# Patient Record
Sex: FEMALE | Race: OTHER | Employment: FULL TIME | URBAN - METROPOLITAN AREA
[De-identification: names, ages, dates, MRNs, and addresses within clinical notes are randomized per-mention and may not be internally consistent; named-entity substitution may affect disease eponyms.]

---

## 2017-04-12 ENCOUNTER — EMPLOYEE WELLNESS (OUTPATIENT)
Dept: OTHER | Age: 62
End: 2017-04-12

## 2017-04-12 LAB
CHOLESTEROL/HDL RATIO: 3.7
CHOLESTEROL/HDL RATIO: NORMAL
CHOLESTEROL: 214 MG/DL
CHOLESTEROL: NORMAL MG/DL
GLUCOSE BLD-MCNC: 75 MG/DL (ref 70–99)
GLUCOSE BLD-MCNC: NORMAL MG/DL
HDLC SERPL-MCNC: 58 MG/DL
HDLC SERPL-MCNC: NORMAL MG/DL
LDL CHOLESTEROL: 136 MG/DL (ref 0–130)
LDL CHOLESTEROL: NORMAL MG/DL
PATIENT FASTING?: ABNORMAL
PATIENT FASTING?: NORMAL
TRIGL SERPL-MCNC: 99 MG/DL
TRIGL SERPL-MCNC: NORMAL MG/DL
VLDLC SERPL CALC-MCNC: ABNORMAL MG/DL (ref 1–30)
VLDLC SERPL CALC-MCNC: NORMAL MG/DL

## 2017-05-09 ENCOUNTER — HOSPITAL ENCOUNTER (OUTPATIENT)
Age: 62
Setting detail: SPECIMEN
Discharge: HOME | End: 2017-05-09
Payer: COMMERCIAL

## 2017-05-12 LAB — SURGICAL PATHOLOGY REPORT: NORMAL

## 2017-05-18 ENCOUNTER — HOSPITAL ENCOUNTER (OUTPATIENT)
Age: 62
Discharge: HOME | End: 2017-05-18
Payer: COMMERCIAL

## 2017-05-18 LAB
ABSOLUTE RETIC #: 0.04 M/UL (ref 0.02–0.1)
FERRITIN: 217 UG/L (ref 13–150)
IRON SATURATION: 22 % (ref 20–55)
IRON: 54 UG/DL (ref 37–145)
RETIC %: 0.9 % (ref 0.5–2)
T3 FREE: 3.39 PG/ML (ref 2.02–4.43)
THYROXINE, FREE: 1.1 NG/DL (ref 0.93–1.7)
TOTAL IRON BINDING CAPACITY: 243 UG/DL (ref 250–450)
TSH SERPL DL<=0.05 MIU/L-ACNC: 2.83 MIU/L (ref 0.3–5)
UNSATURATED IRON BINDING CAPACITY: 189 UG/DL (ref 112–347)
VITAMIN B-12: 699 PG/ML (ref 211–946)
VITAMIN D 25-HYDROXY: 43.5 NG/ML (ref 30–100)

## 2017-05-18 PROCEDURE — 85045 AUTOMATED RETICULOCYTE COUNT: CPT

## 2017-05-18 PROCEDURE — 84481 FREE ASSAY (FT-3): CPT

## 2017-05-18 PROCEDURE — 82728 ASSAY OF FERRITIN: CPT

## 2017-05-18 PROCEDURE — 83540 ASSAY OF IRON: CPT

## 2017-05-18 PROCEDURE — 83550 IRON BINDING TEST: CPT

## 2017-05-18 PROCEDURE — 84439 ASSAY OF FREE THYROXINE: CPT

## 2017-05-18 PROCEDURE — 84443 ASSAY THYROID STIM HORMONE: CPT

## 2017-05-18 PROCEDURE — 82607 VITAMIN B-12: CPT

## 2017-05-18 PROCEDURE — 82306 VITAMIN D 25 HYDROXY: CPT

## 2017-05-18 PROCEDURE — 36415 COLL VENOUS BLD VENIPUNCTURE: CPT

## 2017-06-01 ENCOUNTER — HOSPITAL ENCOUNTER (OUTPATIENT)
Age: 62
Discharge: HOME | End: 2017-06-01
Payer: COMMERCIAL

## 2017-06-01 LAB
CHOLESTEROL, FASTING: 225 MG/DL
CHOLESTEROL/HDL RATIO: 4
HDLC SERPL-MCNC: 56 MG/DL
LDL CHOLESTEROL: 150 MG/DL (ref 0–130)
TRIGLYCERIDE, FASTING: 95 MG/DL
VLDLC SERPL CALC-MCNC: ABNORMAL MG/DL (ref 1–30)

## 2017-06-01 PROCEDURE — 36415 COLL VENOUS BLD VENIPUNCTURE: CPT

## 2017-06-01 PROCEDURE — 80061 LIPID PANEL: CPT

## 2017-07-19 ENCOUNTER — HOSPITAL ENCOUNTER (OUTPATIENT)
Age: 62
Setting detail: SPECIMEN
Discharge: HOME | End: 2017-07-19
Payer: COMMERCIAL

## 2017-07-24 LAB — CYTOLOGY REPORT: NORMAL

## 2017-10-23 ENCOUNTER — HOSPITAL ENCOUNTER (OUTPATIENT)
Age: 62
Discharge: HOME | End: 2017-10-23
Payer: COMMERCIAL

## 2017-10-23 LAB
-: NORMAL
ABSOLUTE EOS #: 0.2 K/UL (ref 0–0.4)
ABSOLUTE EOS #: 0.2 K/UL (ref 0–0.4)
ABSOLUTE IMMATURE GRANULOCYTE: ABNORMAL K/UL (ref 0–0.3)
ABSOLUTE IMMATURE GRANULOCYTE: ABNORMAL K/UL (ref 0–0.3)
ABSOLUTE LYMPH #: 1.4 K/UL (ref 1–4.8)
ABSOLUTE LYMPH #: 1.4 K/UL (ref 1–4.8)
ABSOLUTE MONO #: 0.5 K/UL (ref 0.2–0.8)
ABSOLUTE MONO #: 0.5 K/UL (ref 0.2–0.8)
ABSOLUTE RETIC #: 0.07 M/UL (ref 0.02–0.1)
ALBUMIN SERPL-MCNC: 4.2 G/DL (ref 3.5–5.2)
ALBUMIN/GLOBULIN RATIO: ABNORMAL (ref 1–2.5)
ALP BLD-CCNC: 71 U/L (ref 35–104)
ALT SERPL-CCNC: 15 U/L (ref 5–33)
AMORPHOUS: NORMAL
ANION GAP SERPL CALCULATED.3IONS-SCNC: 14 MMOL/L (ref 9–17)
AST SERPL-CCNC: 19 U/L
BACTERIA: NORMAL
BASOPHILS # BLD: 0 %
BASOPHILS # BLD: 0 %
BASOPHILS ABSOLUTE: 0 K/UL (ref 0–0.2)
BASOPHILS ABSOLUTE: 0 K/UL (ref 0–0.2)
BILIRUB SERPL-MCNC: 0.41 MG/DL (ref 0.3–1.2)
BILIRUBIN URINE: NEGATIVE
BUN BLDV-MCNC: 16 MG/DL (ref 8–23)
BUN/CREAT BLD: 25 (ref 9–20)
CALCIUM IONIZED: 1.24 MMOL/L (ref 1.13–1.33)
CALCIUM SERPL-MCNC: 9.1 MG/DL (ref 8.6–10.4)
CASTS UA: NORMAL /LPF
CHLORIDE BLD-SCNC: 103 MMOL/L (ref 98–107)
CHOLESTEROL, FASTING: 213 MG/DL
CHOLESTEROL/HDL RATIO: 3.2
CO2: 24 MMOL/L (ref 20–31)
COLOR: YELLOW
COMMENT UA: ABNORMAL
CREAT SERPL-MCNC: 0.65 MG/DL (ref 0.5–0.9)
CRYSTALS, UA: NORMAL /HPF
DIFFERENTIAL TYPE: ABNORMAL
DIFFERENTIAL TYPE: ABNORMAL
EOSINOPHILS RELATIVE PERCENT: 3 %
EOSINOPHILS RELATIVE PERCENT: 3 %
EPITHELIAL CELLS UA: NORMAL /HPF
FERRITIN: 154 UG/L (ref 13–150)
GFR AFRICAN AMERICAN: >60 ML/MIN
GFR NON-AFRICAN AMERICAN: >60 ML/MIN
GFR SERPL CREATININE-BSD FRML MDRD: ABNORMAL ML/MIN/{1.73_M2}
GFR SERPL CREATININE-BSD FRML MDRD: ABNORMAL ML/MIN/{1.73_M2}
GLUCOSE FASTING: 76 MG/DL (ref 70–99)
GLUCOSE URINE: NEGATIVE
HCT VFR BLD CALC: 36.3 % (ref 36–46)
HCT VFR BLD CALC: 36.3 % (ref 36–46)
HDLC SERPL-MCNC: 67 MG/DL
HEMOGLOBIN: 12.1 G/DL (ref 12–16)
HEMOGLOBIN: 12.2 G/DL (ref 12–16)
IMMATURE GRANULOCYTES: ABNORMAL %
IMMATURE GRANULOCYTES: ABNORMAL %
IRON SATURATION: 12 % (ref 20–55)
IRON: 31 UG/DL (ref 37–145)
KETONES, URINE: NEGATIVE
LDL CHOLESTEROL: 124 MG/DL (ref 0–130)
LEUKOCYTE ESTERASE, URINE: NEGATIVE
LYMPHOCYTES # BLD: 17 %
LYMPHOCYTES # BLD: 18 %
MCH RBC QN AUTO: 27.5 PG (ref 26–34)
MCH RBC QN AUTO: 27.7 PG (ref 26–34)
MCHC RBC AUTO-ENTMCNC: 33.4 G/DL (ref 31–37)
MCHC RBC AUTO-ENTMCNC: 33.7 G/DL (ref 31–37)
MCV RBC AUTO: 82.1 FL (ref 80–100)
MCV RBC AUTO: 82.4 FL (ref 80–100)
MONOCYTES # BLD: 6 %
MONOCYTES # BLD: 6 %
MUCUS: NORMAL
NITRITE, URINE: NEGATIVE
OTHER OBSERVATIONS UA: NORMAL
PDW BLD-RTO: 15 % (ref 11.5–14.5)
PDW BLD-RTO: 15.1 % (ref 11.5–14.5)
PH UA: 5.5 (ref 5–8)
PLATELET # BLD: 207 K/UL (ref 130–400)
PLATELET # BLD: 211 K/UL (ref 130–400)
PLATELET ESTIMATE: ABNORMAL
PLATELET ESTIMATE: ABNORMAL
PMV BLD AUTO: 9.7 FL (ref 6–12)
PMV BLD AUTO: 9.7 FL (ref 6–12)
POTASSIUM SERPL-SCNC: 3.6 MMOL/L (ref 3.7–5.3)
PROTEIN UA: NEGATIVE
RBC # BLD: 4.4 M/UL (ref 4–5.2)
RBC # BLD: 4.42 M/UL (ref 4–5.2)
RBC # BLD: ABNORMAL 10*6/UL
RBC # BLD: ABNORMAL 10*6/UL
RBC UA: NORMAL /HPF (ref 0–2)
RENAL EPITHELIAL, UA: NORMAL /HPF
RETIC %: 1.5 % (ref 0.5–2)
RETIC HEMOGLOBIN: NORMAL PG (ref 28.2–35.7)
SEG NEUTROPHILS: 73 %
SEG NEUTROPHILS: 74 %
SEGMENTED NEUTROPHILS ABSOLUTE COUNT: 5.9 K/UL (ref 1.8–7.7)
SEGMENTED NEUTROPHILS ABSOLUTE COUNT: 6.1 K/UL (ref 1.8–7.7)
SODIUM BLD-SCNC: 141 MMOL/L (ref 135–144)
SPECIFIC GRAVITY UA: 1.01 (ref 1–1.03)
TOTAL CK: 117 U/L (ref 26–192)
TOTAL IRON BINDING CAPACITY: 258 UG/DL (ref 250–450)
TOTAL PROTEIN: 7.4 G/DL (ref 6.4–8.3)
TRICHOMONAS: NORMAL
TRIGLYCERIDE, FASTING: 109 MG/DL
TSH SERPL DL<=0.05 MIU/L-ACNC: 3.36 MIU/L (ref 0.3–5)
TURBIDITY: CLEAR
UNSATURATED IRON BINDING CAPACITY: 227 UG/DL (ref 112–347)
URINE HGB: ABNORMAL
UROBILINOGEN, URINE: NORMAL
VITAMIN D 25-HYDROXY: 52.5 NG/ML (ref 30–100)
VLDLC SERPL CALC-MCNC: ABNORMAL MG/DL (ref 1–30)
WBC # BLD: 8.1 K/UL (ref 3.5–11)
WBC # BLD: 8.3 K/UL (ref 3.5–11)
WBC # BLD: ABNORMAL 10*3/UL
WBC # BLD: ABNORMAL 10*3/UL
WBC UA: NORMAL /HPF (ref 0–5)
YEAST: NORMAL

## 2017-10-23 PROCEDURE — 82550 ASSAY OF CK (CPK): CPT

## 2017-10-23 PROCEDURE — 85025 COMPLETE CBC W/AUTO DIFF WBC: CPT

## 2017-10-23 PROCEDURE — 85045 AUTOMATED RETICULOCYTE COUNT: CPT

## 2017-10-23 PROCEDURE — 80053 COMPREHEN METABOLIC PANEL: CPT

## 2017-10-23 PROCEDURE — 82728 ASSAY OF FERRITIN: CPT

## 2017-10-23 PROCEDURE — 83550 IRON BINDING TEST: CPT

## 2017-10-23 PROCEDURE — 36415 COLL VENOUS BLD VENIPUNCTURE: CPT

## 2017-10-23 PROCEDURE — 83540 ASSAY OF IRON: CPT

## 2017-10-23 PROCEDURE — 84443 ASSAY THYROID STIM HORMONE: CPT

## 2017-10-23 PROCEDURE — 82306 VITAMIN D 25 HYDROXY: CPT

## 2017-10-23 PROCEDURE — 82330 ASSAY OF CALCIUM: CPT

## 2017-10-23 PROCEDURE — 80061 LIPID PANEL: CPT

## 2017-10-23 PROCEDURE — 81001 URINALYSIS AUTO W/SCOPE: CPT

## 2017-11-16 ENCOUNTER — TELEPHONE (OUTPATIENT)
Dept: INFUSION THERAPY | Facility: MEDICAL CENTER | Age: 62
End: 2017-11-16

## 2017-11-22 PROBLEM — M81.0 AGE-RELATED OSTEOPOROSIS WITHOUT CURRENT PATHOLOGICAL FRACTURE: Status: ACTIVE | Noted: 2017-11-22

## 2017-12-01 ENCOUNTER — HOSPITAL ENCOUNTER (OUTPATIENT)
Dept: INFUSION THERAPY | Facility: MEDICAL CENTER | Age: 62
Discharge: HOME | End: 2017-12-01
Payer: COMMERCIAL

## 2017-12-01 VITALS
HEART RATE: 58 BPM | SYSTOLIC BLOOD PRESSURE: 118 MMHG | TEMPERATURE: 97.4 F | DIASTOLIC BLOOD PRESSURE: 57 MMHG | RESPIRATION RATE: 18 BRPM

## 2017-12-01 DIAGNOSIS — M81.0 AGE-RELATED OSTEOPOROSIS WITHOUT CURRENT PATHOLOGICAL FRACTURE: ICD-10-CM

## 2017-12-01 PROCEDURE — 96401 CHEMO ANTI-NEOPL SQ/IM: CPT

## 2017-12-01 NOTE — PROGRESS NOTES
Patient arrives per ambulation for prolia injection. Orders reviewed. Patient states, \"I am not taking Boniva. \"  Tolerated injection well, no complaints voiced. Patient given reminder card from medication container when next prolia injection due, June 2018. Patient states, \"Will go to front office to schedule next injection. Discharged per ambulation.

## 2018-03-20 VITALS — BODY MASS INDEX: 21.8 KG/M2 | WEIGHT: 127 LBS

## 2018-04-17 ENCOUNTER — EMPLOYEE WELLNESS (OUTPATIENT)
Dept: OTHER | Age: 63
End: 2018-04-17

## 2018-04-17 LAB
CHOLESTEROL/HDL RATIO: 4.5
CHOLESTEROL: 248 MG/DL
GLUCOSE BLD-MCNC: 84 MG/DL (ref 70–99)
HDLC SERPL-MCNC: 55 MG/DL
LDL CHOLESTEROL: 170 MG/DL (ref 0–130)
PATIENT FASTING?: YES
TRIGL SERPL-MCNC: 117 MG/DL
VLDLC SERPL CALC-MCNC: ABNORMAL MG/DL (ref 1–30)

## 2018-04-23 VITALS — WEIGHT: 125 LBS | BODY MASS INDEX: 21.46 KG/M2

## 2018-08-01 DIAGNOSIS — E61.1 IRON DEFICIENCY: ICD-10-CM

## 2018-08-17 ENCOUNTER — HOSPITAL ENCOUNTER (OUTPATIENT)
Dept: MAMMOGRAPHY | Age: 63
Discharge: HOME | End: 2018-08-19
Payer: COMMERCIAL

## 2018-08-17 ENCOUNTER — HOSPITAL ENCOUNTER (OUTPATIENT)
Age: 63
Discharge: HOME | End: 2018-08-17
Payer: COMMERCIAL

## 2018-08-17 DIAGNOSIS — E61.1 IRON DEFICIENCY: ICD-10-CM

## 2018-08-17 DIAGNOSIS — Z13.820 SCREENING FOR OSTEOPOROSIS: ICD-10-CM

## 2018-08-17 DIAGNOSIS — Z12.39 SCREENING BREAST EXAMINATION: ICD-10-CM

## 2018-08-17 LAB
ABSOLUTE EOS #: 0.1 K/UL (ref 0–0.4)
ABSOLUTE IMMATURE GRANULOCYTE: ABNORMAL K/UL (ref 0–0.3)
ABSOLUTE LYMPH #: 1.9 K/UL (ref 1–4.8)
ABSOLUTE MONO #: 0.4 K/UL (ref 0.2–0.8)
ABSOLUTE RETIC #: 0.04 M/UL (ref 0.02–0.1)
BASOPHILS # BLD: 0 % (ref 0–2)
BASOPHILS ABSOLUTE: 0 K/UL (ref 0–0.2)
DIFFERENTIAL TYPE: ABNORMAL
EOSINOPHILS RELATIVE PERCENT: 2 % (ref 1–4)
FERRITIN: 176 UG/L (ref 13–150)
HCT VFR BLD CALC: 39.5 % (ref 36–46)
HEMOGLOBIN: 12.9 G/DL (ref 12–16)
IMMATURE GRANULOCYTES: ABNORMAL %
IMMATURE RETIC FRACT: NORMAL %
IRON SATURATION: 26 % (ref 20–55)
IRON: 68 UG/DL (ref 37–145)
LYMPHOCYTES # BLD: 31 % (ref 24–44)
MCH RBC QN AUTO: 27.2 PG (ref 26–34)
MCHC RBC AUTO-ENTMCNC: 32.8 G/DL (ref 31–37)
MCV RBC AUTO: 83.2 FL (ref 80–100)
MONOCYTES # BLD: 6 % (ref 1–7)
NRBC AUTOMATED: ABNORMAL PER 100 WBC
PDW BLD-RTO: 14.9 % (ref 11.5–14.5)
PLATELET # BLD: 242 K/UL (ref 130–400)
PLATELET ESTIMATE: ABNORMAL
PMV BLD AUTO: 9.9 FL (ref 6–12)
RBC # BLD: 4.75 M/UL (ref 4–5.2)
RBC # BLD: ABNORMAL 10*6/UL
RETIC %: 0.9 % (ref 0.5–2)
RETIC HEMOGLOBIN: NORMAL PG (ref 28.2–35.7)
SEG NEUTROPHILS: 61 % (ref 36–66)
SEGMENTED NEUTROPHILS ABSOLUTE COUNT: 3.7 K/UL (ref 1.8–7.7)
TOTAL IRON BINDING CAPACITY: 260 UG/DL (ref 250–450)
UNSATURATED IRON BINDING CAPACITY: 192 UG/DL (ref 112–347)
WBC # BLD: 6.1 K/UL (ref 3.5–11)
WBC # BLD: ABNORMAL 10*3/UL

## 2018-08-17 PROCEDURE — 83540 ASSAY OF IRON: CPT

## 2018-08-17 PROCEDURE — 85045 AUTOMATED RETICULOCYTE COUNT: CPT

## 2018-08-17 PROCEDURE — 85025 COMPLETE CBC W/AUTO DIFF WBC: CPT

## 2018-08-17 PROCEDURE — 36415 COLL VENOUS BLD VENIPUNCTURE: CPT

## 2018-08-17 PROCEDURE — 77080 DXA BONE DENSITY AXIAL: CPT

## 2018-08-17 PROCEDURE — 82728 ASSAY OF FERRITIN: CPT

## 2018-08-17 PROCEDURE — 83550 IRON BINDING TEST: CPT

## 2018-08-17 PROCEDURE — 77067 SCR MAMMO BI INCL CAD: CPT

## 2018-08-20 ENCOUNTER — TELEPHONE (OUTPATIENT)
Dept: ONCOLOGY | Age: 63
End: 2018-08-20

## 2018-08-22 ENCOUNTER — HOSPITAL ENCOUNTER (OUTPATIENT)
Age: 63
Discharge: HOME | End: 2018-08-24
Payer: COMMERCIAL

## 2018-08-22 ENCOUNTER — HOSPITAL ENCOUNTER (OUTPATIENT)
Dept: GENERAL RADIOLOGY | Age: 63
Discharge: HOME | End: 2018-08-24
Payer: COMMERCIAL

## 2018-08-22 DIAGNOSIS — M25.552 LEFT HIP PAIN: ICD-10-CM

## 2018-08-22 PROCEDURE — 73502 X-RAY EXAM HIP UNI 2-3 VIEWS: CPT

## 2018-09-25 ENCOUNTER — HOSPITAL ENCOUNTER (OUTPATIENT)
Age: 63
Discharge: HOME | End: 2018-09-25
Payer: COMMERCIAL

## 2018-09-25 LAB
ALBUMIN SERPL-MCNC: 4.5 G/DL (ref 3.5–5.2)
ALBUMIN/GLOBULIN RATIO: ABNORMAL (ref 1–2.5)
ALP BLD-CCNC: 53 U/L (ref 35–104)
ALT SERPL-CCNC: 13 U/L (ref 5–33)
ANION GAP SERPL CALCULATED.3IONS-SCNC: 11 MMOL/L (ref 9–17)
AST SERPL-CCNC: 17 U/L
BILIRUB SERPL-MCNC: 0.31 MG/DL (ref 0.3–1.2)
BUN BLDV-MCNC: 16 MG/DL (ref 8–23)
BUN/CREAT BLD: 27 (ref 9–20)
CALCIUM SERPL-MCNC: 9.6 MG/DL (ref 8.6–10.4)
CHLORIDE BLD-SCNC: 103 MMOL/L (ref 98–107)
CHOLESTEROL, FASTING: 229 MG/DL
CHOLESTEROL/HDL RATIO: 3.9
CO2: 27 MMOL/L (ref 20–31)
CREAT SERPL-MCNC: 0.6 MG/DL (ref 0.5–0.9)
GFR AFRICAN AMERICAN: >60 ML/MIN
GFR NON-AFRICAN AMERICAN: >60 ML/MIN
GFR SERPL CREATININE-BSD FRML MDRD: ABNORMAL ML/MIN/{1.73_M2}
GFR SERPL CREATININE-BSD FRML MDRD: ABNORMAL ML/MIN/{1.73_M2}
GLUCOSE FASTING: 89 MG/DL (ref 70–99)
HDLC SERPL-MCNC: 59 MG/DL
LDL CHOLESTEROL: 149 MG/DL (ref 0–130)
POTASSIUM SERPL-SCNC: 4.6 MMOL/L (ref 3.7–5.3)
SODIUM BLD-SCNC: 141 MMOL/L (ref 135–144)
T3 FREE: 3.26 PG/ML (ref 2.02–4.43)
T4 TOTAL: 7.8 UG/DL (ref 4.5–12)
TOTAL PROTEIN: 7.5 G/DL (ref 6.4–8.3)
TRIGLYCERIDE, FASTING: 103 MG/DL
TSH SERPL DL<=0.05 MIU/L-ACNC: 3.82 MIU/L (ref 0.3–5)
VITAMIN B-12: >2000 PG/ML (ref 232–1245)
VITAMIN D 25-HYDROXY: 51.8 NG/ML (ref 30–100)
VLDLC SERPL CALC-MCNC: ABNORMAL MG/DL (ref 1–30)

## 2018-09-25 PROCEDURE — 82306 VITAMIN D 25 HYDROXY: CPT

## 2018-09-25 PROCEDURE — 36415 COLL VENOUS BLD VENIPUNCTURE: CPT

## 2018-09-25 PROCEDURE — 82607 VITAMIN B-12: CPT

## 2018-09-25 PROCEDURE — 80061 LIPID PANEL: CPT

## 2018-09-25 PROCEDURE — 84481 FREE ASSAY (FT-3): CPT

## 2018-09-25 PROCEDURE — 84443 ASSAY THYROID STIM HORMONE: CPT

## 2018-09-25 PROCEDURE — 86800 THYROGLOBULIN ANTIBODY: CPT

## 2018-09-25 PROCEDURE — 80053 COMPREHEN METABOLIC PANEL: CPT

## 2018-09-25 PROCEDURE — 86376 MICROSOMAL ANTIBODY EACH: CPT

## 2018-09-25 PROCEDURE — 84436 ASSAY OF TOTAL THYROXINE: CPT

## 2018-09-26 LAB
THYROGLOBULIN AB: <20 IU/ML (ref 0–40)
THYROID PEROXIDASE (TPO) AB: 96.6 IU/ML (ref 0–35)

## 2018-09-27 ENCOUNTER — HOSPITAL ENCOUNTER (OUTPATIENT)
Age: 63
Discharge: HOME | End: 2018-09-27
Payer: COMMERCIAL

## 2018-09-27 DIAGNOSIS — M79.89 PAIN AND SWELLING OF LOWER LEG, UNSPECIFIED LATERALITY: Primary | ICD-10-CM

## 2018-09-27 DIAGNOSIS — M79.669 PAIN AND SWELLING OF LOWER LEG, UNSPECIFIED LATERALITY: Primary | ICD-10-CM

## 2018-09-27 PROCEDURE — 93970 EXTREMITY STUDY: CPT

## 2018-11-06 ENCOUNTER — HOSPITAL ENCOUNTER (OUTPATIENT)
Age: 63
Setting detail: SPECIMEN
Discharge: HOME | End: 2018-11-06
Payer: COMMERCIAL

## 2018-11-15 ENCOUNTER — HOSPITAL ENCOUNTER (OUTPATIENT)
Dept: GENERAL RADIOLOGY | Age: 63
Discharge: HOME | End: 2018-11-17
Payer: COMMERCIAL

## 2018-11-15 ENCOUNTER — HOSPITAL ENCOUNTER (OUTPATIENT)
Age: 63
Discharge: HOME | End: 2018-11-17
Payer: COMMERCIAL

## 2018-11-15 DIAGNOSIS — M79.605 PAIN OF LEFT LEG: ICD-10-CM

## 2018-11-15 PROCEDURE — 73552 X-RAY EXAM OF FEMUR 2/>: CPT

## 2018-11-26 ENCOUNTER — HOSPITAL ENCOUNTER (OUTPATIENT)
Dept: PHYSICAL THERAPY | Facility: CLINIC | Age: 63
Setting detail: THERAPIES SERIES
Discharge: HOME | End: 2018-11-26
Payer: COMMERCIAL

## 2018-11-26 LAB — CYTOLOGY REPORT: NORMAL

## 2018-11-26 PROCEDURE — 97110 THERAPEUTIC EXERCISES: CPT

## 2018-11-26 PROCEDURE — 97161 PT EVAL LOW COMPLEX 20 MIN: CPT

## 2018-11-28 ENCOUNTER — HOSPITAL ENCOUNTER (OUTPATIENT)
Dept: PHYSICAL THERAPY | Facility: CLINIC | Age: 63
Setting detail: THERAPIES SERIES
Discharge: HOME | End: 2018-11-28
Payer: COMMERCIAL

## 2018-11-28 PROCEDURE — 97110 THERAPEUTIC EXERCISES: CPT

## 2018-11-28 PROCEDURE — 97016 VASOPNEUMATIC DEVICE THERAPY: CPT

## 2018-12-03 ENCOUNTER — HOSPITAL ENCOUNTER (OUTPATIENT)
Dept: PHYSICAL THERAPY | Facility: CLINIC | Age: 63
Setting detail: THERAPIES SERIES
Discharge: HOME | End: 2018-12-03
Payer: COMMERCIAL

## 2018-12-03 PROCEDURE — 97110 THERAPEUTIC EXERCISES: CPT

## 2018-12-03 PROCEDURE — 97016 VASOPNEUMATIC DEVICE THERAPY: CPT

## 2018-12-05 ENCOUNTER — APPOINTMENT (OUTPATIENT)
Dept: PHYSICAL THERAPY | Facility: CLINIC | Age: 63
End: 2018-12-05
Payer: COMMERCIAL

## 2018-12-07 ENCOUNTER — HOSPITAL ENCOUNTER (OUTPATIENT)
Dept: PHYSICAL THERAPY | Facility: CLINIC | Age: 63
Setting detail: THERAPIES SERIES
Discharge: HOME | End: 2018-12-07
Payer: COMMERCIAL

## 2018-12-10 ENCOUNTER — HOSPITAL ENCOUNTER (OUTPATIENT)
Dept: PHYSICAL THERAPY | Facility: CLINIC | Age: 63
Setting detail: THERAPIES SERIES
Discharge: HOME | End: 2018-12-10
Payer: COMMERCIAL

## 2018-12-10 PROCEDURE — 97110 THERAPEUTIC EXERCISES: CPT

## 2018-12-10 PROCEDURE — 97016 VASOPNEUMATIC DEVICE THERAPY: CPT

## 2018-12-10 PROCEDURE — 97140 MANUAL THERAPY 1/> REGIONS: CPT

## 2018-12-10 NOTE — FLOWSHEET NOTE
min     Specific Instructions for next treatment: Continue hip strengthening and IT band stretching. Ice for hip bursitis.             Treatment Charges: Mins Units   []  Modalities     [x]  Ther Exercise 20 1   [x]  Manual Therapy 10 1   []  Ther Activities     []  Aquatics     [x]  Vasocompression 15 1   []  Other     Total Treatment time 45 3       Assessment: [x] Progressing toward goals. Pt tolerates treatment well with no c/o pain. Pt requires cuing during exercises to execute with proper technique, and requests more time spent with roller massage as she found that to be very beneficial after last treatment. [] No change. [] Other:    STG: (to be met in 6 treatments)  1. ? Pain: Pt will report decreased pain to 3/10 to improve ability to stand at work. 2. ? ROM: Pt will improve L hip IR ROM from 27 degrees to 43 degrees to match the R hip and improve ability to putt on shoes and socks (item e). 3. ? Strength: Pt will improve L hip flexion and abduction to 5/5 to improve ability to lift objects off the floor (item g). 4. ? Function: Pt will improve the ability to participate in recreational activities of walking from quite a bit of difficulty to moderate difficulty (item b). 5. Independent with Home Exercise Programs  6. Demonstrate Knowledge of fall prevention  LTG: (to be met in 12 treatments)  1. Pt will report decreased pain to 0/10 to improve ability to stand all day at work. 2. Pt will report ability to walk 5 miles in usual exercise routine in order to improve participation.                     Patient goals: Increase hip strength    Pt. Education:  [x] Yes  [x] No  [x] Reviewed Prior HEP/Ed  Method of Education: [x] Verbal  [x] Demo  [] Written    Comprehension of Education:  [] Verbalizes understanding. [] Demonstrates understanding. [] Needs review. [x] Demonstrates/verbalizes HEP/Ed previously given. Plan: [x] Continue per plan of care.    [] Other:      Time In: 6:10pm Time Out: 7:00pm    Electronically signed by:  Irvin Frances PTA

## 2018-12-12 ENCOUNTER — HOSPITAL ENCOUNTER (OUTPATIENT)
Dept: PHYSICAL THERAPY | Facility: CLINIC | Age: 63
Setting detail: THERAPIES SERIES
Discharge: HOME | End: 2018-12-12
Payer: COMMERCIAL

## 2018-12-12 PROCEDURE — 97110 THERAPEUTIC EXERCISES: CPT

## 2018-12-12 PROCEDURE — 97140 MANUAL THERAPY 1/> REGIONS: CPT

## 2018-12-12 PROCEDURE — 97016 VASOPNEUMATIC DEVICE THERAPY: CPT

## 2018-12-12 NOTE — FLOWSHEET NOTE
[] [facility]       Outpatient Physical        Therapy       [address]       Phone: [phone]       Fax: [phone] [x] [facility] at [address]       Phone: [phone]       Fax: [phone] [] [facility]. [address] [place] [address] Health Promotion  [address] [place] [address]   Phone: [phone]   Fax:  [phone]     Physical Therapy Daily Treatment Note    Date:  2018  Patient Name:  Andres Pollard    :  1955  MRN: [id number]  Physician: Karlene George MD     Insurance: Medical [employer]  Diagnosis: L hip pain  Onset Date: 2018  Next Dr. Herminio Holcomb: TBD  Visit# / total visits:   Cancels/No Shows: 1/0    Subjective:    Pain:  [] Yes  [x] No Location: left hip Pain Rating: (0-10 scale) 0/10 at this time  Pain altered Tx:  [x] No  [] Yes  Action:    Comments: Pt again denying pain at this time. States that she \"feels pretty good\".     Objective:  Modalities: vasocompression with Moderate pressure and 34 degrees supine   Precautions:  Exercises: bolded completed   Exercise Reps/ Time Weight/ Level Comments   Nustep 5' L4 Added    OKC 3 way hip 20 each blueberry increased reps 12/10/18   CKC 3 way hip 20 each blueberry     Step ups  15x LLE 6\" Added  with glute/hip engagement    Monster walk  15 feet green Max cuing added    Lateral steppinng 20 feet both directions Green at ankle Added  max cuing                MAT      Reverse clamshell 20 Green   progressed resistance 12/10/18   clamshell 20 Green   progressed resistance 12/10/18   Long sitting cross stretch 30\"x2  Added    IT band stretch star 30\"x2   Added     Bridge with Abduction and band 30x Blue  t-band  Progressed resistance 12/10/18   SLR flexion 15x  Added    Hip ext 20x  Added    Sidelying hip abduction 20  Added    Other:roller bar massage this date to L hip and IT band in side lying with pillow

## 2018-12-17 ENCOUNTER — HOSPITAL ENCOUNTER (OUTPATIENT)
Dept: PHYSICAL THERAPY | Facility: CLINIC | Age: 63
Setting detail: THERAPIES SERIES
Discharge: HOME | End: 2018-12-17
Payer: COMMERCIAL

## 2018-12-17 NOTE — FLOWSHEET NOTE
[] [facility] [address]  [address]    [phone]  F: [phone] [] [address]  [facility]   [address]  P: [phone]  F: [phone] [] [place]  [address] [facility]  P: [phone]  F: [phone] [] [address]  [facility]   [address]   [place]: [phone]  F: [phone] [] [facility]  [address] [facility] [address]  [place]: [phone]   F: [phone]      Physical Therapy Cancel/No Show note    Date: 2018  Patient: Megan Simmons  : 1955  MRN: [id number]    Cancels/No Shows to date:     For today's appointment patient:  [x]  Cancelled  []  Rescheduled appointment  []  No-show     Reason given by patient:  []  Patient ill  []  Conflicting appointment  []  No transportation    []  Conflict with work  []  No reason given  []  Weather related  [x]  Other:      Comments:  Pt arrives to therapy this date 1/2 hour early noting she would like to cancel today. States she is in high pain today and this is the first time since the beginning of therapy. Pt reports pain was so high she had her  pick her up from work. Pt declined cyrotherapy today.    [x]  Next appointment was confirmed    Electronically signed by: Shirlene Berrios PTA

## 2018-12-19 ENCOUNTER — HOSPITAL ENCOUNTER (OUTPATIENT)
Dept: PHYSICAL THERAPY | Facility: CLINIC | Age: 63
Setting detail: THERAPIES SERIES
Discharge: HOME | End: 2018-12-19
Payer: COMMERCIAL

## 2018-12-19 PROCEDURE — 97016 VASOPNEUMATIC DEVICE THERAPY: CPT

## 2018-12-19 PROCEDURE — 97140 MANUAL THERAPY 1/> REGIONS: CPT

## 2018-12-19 PROCEDURE — 97110 THERAPEUTIC EXERCISES: CPT

## 2018-12-19 NOTE — FLOWSHEET NOTE
[] Lar[facility]       Outpatient Physical        Therapy       [address]       Phone: [phone]       Fax: [phone] [x] [facility] Promotion at [address]       Phone: [phone]       Fax: [phone] [] [place]. [address] [place] [address] Health Promotion  [address] [place] [address]   Phone: [phone]   Fax:  [phone]     Physical Therapy Daily Treatment Note    Date:  2018  Patient Name:  Arlene Gutierrez    :  1955  MRN: [id number]  Physician: Angelique Gray MD     Insurance: [employer] [place]  Diagnosis: L hip pain  Onset Date: 2018  Next Dr. Lozano Danny: TBD  Visit# / total visits:   Cancels/No Shows: 2/0    Subjective:    Pain:  [] Yes  [x] No Location: left hip Pain Rating: (0-10 scale) 0/10 at this time  Pain altered Tx:  [x] No  [] Yes  Action:    Comments: Patient reporting Monday was very painful for her, enough that she couldn't make therapy.      Objective:  Modalities: vasocompression with Moderate pressure and 34 degrees supine   Precautions:  Exercises: bolded completed   Exercise Reps/ Time Weight/ Level Comments   Bike 5' L15 Added  Progressed to bike    OKC 3 way hip 20 each blueberry increased reps 12/10/18   CKC 3 way hip 20 each blueberry     Step ups  15x LLE 6\" Added  with glute/hip engagement    Monster walk  15 feet green Max cuing added    Lateral steppinng 20 feet both directions Green at ankle Added  max cuing                MAT      Reverse clamshell 20 Blue  progressed resistance 18   clamshell 20 Blue  progressed resistance 18   Long sitting cross stretch 30\"x2  Added    IT band stretch star 30\"x2   Added     Bridge with Abduction and band 30x Blue  t-band  Progressed resistance 12/10/18   SLR flexion 15x  Added    Hip ext 20x  Added    Sidelying hip abduction 20  Added    Other:roller bar massage this date to L hip and IT band in side lying with pillow between knees x 10 min     Specific Instructions for next treatment: Continue hip strengthening and IT band stretching. Ice for hip bursitis.             Treatment Charges: Mins Units   []  Modalities     [x]  Ther Exercise 25 1   [x]  Manual Therapy 10 1   []  Ther Activities     []  Aquatics     [x]  Vasocompression 10 1   []  Other     Total Treatment time 45 3       Assessment: [x] Progressing toward goals. [] No change. [] Other:    STG: (to be met in 6 treatments)  1. ? Pain: Pt will report decreased pain to 3/10 to improve ability to stand at work. Met 12/19/18  2. ? ROM: Pt will improve L hip IR ROM from 27 degrees to 43 degrees to match the R hip and improve ability to putt on shoes and socks (item e). 3. ? Strength: Pt will improve L hip flexion and abduction to 5/5 to improve ability to lift objects off the floor (item g). 4. ? Function: Pt will improve the ability to participate in recreational activities of walking from quite a bit of difficulty to moderate difficulty (item b). 5. Independent with Home Exercise Programs Met 12/19/18  6. Demonstrate Knowledge of fall prevention Met 12/19/18  LTG: (to be met in 12 treatments)  1. Pt will report decreased pain to 0/10 to improve ability to stand all day at work. 2. Pt will report ability to walk 5 miles in usual exercise routine in order to improve participation.                     Patient goals: Increase hip strength    Pt. Education:  [x] Yes  [x] No  [x] Reviewed Prior HEP/Ed  Method of Education: [x] Verbal  [x] Demo  [] Written    Comprehension of Education:  [] Verbalizes understanding. [] Demonstrates understanding. [] Needs review. [x] Demonstrates/verbalizes HEP/Ed previously given. Plan: [x] Continue per plan of care.    [] Other:      Time In: 5:55 pm            Time Out: 6:52 pm    Electronically signed by:  Eliezer Kelly PTA

## 2018-12-21 ENCOUNTER — HOSPITAL ENCOUNTER (OUTPATIENT)
Dept: PHYSICAL THERAPY | Facility: CLINIC | Age: 63
Setting detail: THERAPIES SERIES
Discharge: HOME | End: 2018-12-21
Payer: COMMERCIAL

## 2018-12-21 ENCOUNTER — TELEPHONE (OUTPATIENT)
Dept: INTERNAL MEDICINE | Age: 63
End: 2018-12-21

## 2018-12-21 PROCEDURE — 97110 THERAPEUTIC EXERCISES: CPT

## 2018-12-21 NOTE — FLOWSHEET NOTE
stretch 30\"x2  Added 11/28   IT band stretch star 30\"x2   Added 11/28    Bridge with Abduction and band 30x Blue  t-band  Progressed resistance 12/10/18   Marches 15x 1# Added 12/21   SLR flexion 15x 1# Added weight 12/21    Hip ext 15x 1# Added weight 12/21    Sidelying hip abduction 15x 1# Added weight 12/21    Other:roller bar massage this date to L hip and IT band in side lying with pillow between knees x 10 min     Specific Instructions for next treatment: Continue hip strengthening and IT band stretching. Ice for hip bursitis.             Treatment Charges: Mins Units   []  Modalities     [x]  Ther Exercise 45 3   []  Manual Therapy     []  Ther Activities     []  Aquatics     []  Vasocompression     []  Other     Total Treatment time 45 3       Assessment: [x] Progressing toward goals. Patient demonstrating good tolerance to exercise progression as charted above with no complaints of L hip or thigh pain during session with initiation of new exercises. Patient deferring vasocompression at end of session. See STG assessment below- patient continues to demonstrate mild L hip IR PROM limitations this date. [] No change. [] Other:    STG: (to be met in 6 treatments)  1. ? Pain: Pt will report decreased pain to 3/10 to improve ability to stand at work. Met 12/19/18  2. ? ROM: Pt will improve L hip IR ROM from 27 degrees to 43 degrees to match the R hip and improve ability to putt on shoes and socks (item e). Ongoing 12/21/18- measured at 35 degrees  3. ? Strength: Pt will improve L hip flexion and abduction to 5/5 to improve ability to lift objects off the floor (item g). Met 12/21/18 5/5 L hip abduction and flexion  4. ? Function: Pt will improve the ability to participate in recreational activities of walking from quite a bit of difficulty to moderate difficulty (item b). Met 12/21/18  5. Independent with Home Exercise Programs Met 12/19/18  6.  Demonstrate Knowledge of fall prevention Met 12/19/18  LTG:

## 2018-12-28 ENCOUNTER — HOSPITAL ENCOUNTER (OUTPATIENT)
Age: 63
Discharge: HOME | End: 2018-12-28
Payer: COMMERCIAL

## 2018-12-28 LAB
ABSOLUTE EOS #: 0.08 K/UL (ref 0–0.44)
ABSOLUTE IMMATURE GRANULOCYTE: <0.03 K/UL (ref 0–0.3)
ABSOLUTE LYMPH #: 1.77 K/UL (ref 1.1–3.7)
ABSOLUTE MONO #: 0.49 K/UL (ref 0.1–1.2)
ABSOLUTE RETIC #: 0.04 M/UL (ref 0.03–0.08)
BASOPHILS # BLD: 0 % (ref 0–2)
BASOPHILS ABSOLUTE: 0.03 K/UL (ref 0–0.2)
DIFFERENTIAL TYPE: ABNORMAL
EOSINOPHILS RELATIVE PERCENT: 1 % (ref 1–4)
FERRITIN: 224 UG/L (ref 13–150)
HCT VFR BLD CALC: 37.6 % (ref 36.3–47.1)
HEMOGLOBIN: 12.6 G/DL (ref 11.9–15.1)
IMMATURE GRANULOCYTES: 0 %
IMMATURE RETIC FRACT: 7.6 % (ref 2.7–18.3)
IRON SATURATION: 28 % (ref 20–55)
IRON: 77 UG/DL (ref 37–145)
LYMPHOCYTES # BLD: 20 % (ref 24–43)
MCH RBC QN AUTO: 27.6 PG (ref 25.2–33.5)
MCHC RBC AUTO-ENTMCNC: 33.5 G/DL (ref 28.4–34.8)
MCV RBC AUTO: 82.5 FL (ref 82.6–102.9)
MONOCYTES # BLD: 6 % (ref 3–12)
NRBC AUTOMATED: 0 PER 100 WBC
PDW BLD-RTO: 14.3 % (ref 11.8–14.4)
PLATELET # BLD: 296 K/UL (ref 138–453)
PLATELET ESTIMATE: ABNORMAL
PMV BLD AUTO: 11.8 FL (ref 8.1–13.5)
RBC # BLD: 4.56 M/UL (ref 3.95–5.11)
RBC # BLD: ABNORMAL 10*6/UL
RETIC %: 1 % (ref 0.5–1.9)
RETIC HEMOGLOBIN: 33.9 PG (ref 28.2–35.7)
SEG NEUTROPHILS: 73 % (ref 36–65)
SEGMENTED NEUTROPHILS ABSOLUTE COUNT: 6.42 K/UL (ref 1.5–8.1)
TOTAL IRON BINDING CAPACITY: 276 UG/DL (ref 250–450)
UNSATURATED IRON BINDING CAPACITY: 199 UG/DL (ref 112–347)
WBC # BLD: 8.8 K/UL (ref 3.5–11.3)
WBC # BLD: ABNORMAL 10*3/UL

## 2018-12-28 PROCEDURE — 85045 AUTOMATED RETICULOCYTE COUNT: CPT

## 2018-12-28 PROCEDURE — 36415 COLL VENOUS BLD VENIPUNCTURE: CPT

## 2018-12-28 PROCEDURE — 83550 IRON BINDING TEST: CPT

## 2018-12-28 PROCEDURE — 85025 COMPLETE CBC W/AUTO DIFF WBC: CPT

## 2018-12-28 PROCEDURE — 83540 ASSAY OF IRON: CPT

## 2018-12-28 PROCEDURE — 82728 ASSAY OF FERRITIN: CPT

## 2019-05-24 ENCOUNTER — HOSPITAL ENCOUNTER (OUTPATIENT)
Age: 64
Discharge: HOME | End: 2019-05-24
Payer: COMMERCIAL

## 2019-05-24 LAB
ABSOLUTE EOS #: 0.11 K/UL (ref 0–0.44)
ABSOLUTE IMMATURE GRANULOCYTE: <0.03 K/UL (ref 0–0.3)
ABSOLUTE LYMPH #: 1.79 K/UL (ref 1.1–3.7)
ABSOLUTE MONO #: 0.41 K/UL (ref 0.1–1.2)
ABSOLUTE RETIC #: 0.06 M/UL (ref 0.03–0.08)
ALBUMIN SERPL-MCNC: 4.6 G/DL (ref 3.5–5.2)
ALBUMIN/GLOBULIN RATIO: 1.5 (ref 1–2.5)
ALP BLD-CCNC: 49 U/L (ref 35–104)
ALT SERPL-CCNC: 10 U/L (ref 5–33)
ANION GAP SERPL CALCULATED.3IONS-SCNC: 13 MMOL/L (ref 9–17)
AST SERPL-CCNC: 17 U/L
BASOPHILS # BLD: 0 % (ref 0–2)
BASOPHILS ABSOLUTE: <0.03 K/UL (ref 0–0.2)
BILIRUB SERPL-MCNC: 0.33 MG/DL (ref 0.3–1.2)
BUN BLDV-MCNC: 16 MG/DL (ref 8–23)
BUN/CREAT BLD: NORMAL (ref 9–20)
CALCIUM SERPL-MCNC: 9.2 MG/DL (ref 8.6–10.4)
CHLORIDE BLD-SCNC: 107 MMOL/L (ref 98–107)
CHOLESTEROL, FASTING: 224 MG/DL
CHOLESTEROL/HDL RATIO: 4.1
CO2: 24 MMOL/L (ref 20–31)
CREAT SERPL-MCNC: 0.59 MG/DL (ref 0.5–0.9)
DIFFERENTIAL TYPE: NORMAL
EOSINOPHILS RELATIVE PERCENT: 2 % (ref 1–4)
FERRITIN: 182 UG/L (ref 13–150)
GFR AFRICAN AMERICAN: >60 ML/MIN
GFR NON-AFRICAN AMERICAN: >60 ML/MIN
GFR SERPL CREATININE-BSD FRML MDRD: NORMAL ML/MIN/{1.73_M2}
GFR SERPL CREATININE-BSD FRML MDRD: NORMAL ML/MIN/{1.73_M2}
GLUCOSE FASTING: 99 MG/DL (ref 70–99)
HCT VFR BLD CALC: 42 % (ref 36.3–47.1)
HDLC SERPL-MCNC: 55 MG/DL
HEMOGLOBIN: 13.1 G/DL (ref 11.9–15.1)
IMMATURE GRANULOCYTES: 0 %
IMMATURE RETIC FRACT: 12.5 % (ref 2.7–18.3)
IRON SATURATION: 25 % (ref 20–55)
IRON: 66 UG/DL (ref 37–145)
LDL CHOLESTEROL: 150 MG/DL (ref 0–130)
LYMPHOCYTES # BLD: 31 % (ref 24–43)
MCH RBC QN AUTO: 26.7 PG (ref 25.2–33.5)
MCHC RBC AUTO-ENTMCNC: 31.2 G/DL (ref 28.4–34.8)
MCV RBC AUTO: 85.7 FL (ref 82.6–102.9)
MONOCYTES # BLD: 7 % (ref 3–12)
NRBC AUTOMATED: 0 PER 100 WBC
PDW BLD-RTO: 14 % (ref 11.8–14.4)
PLATELET # BLD: 232 K/UL (ref 138–453)
PLATELET ESTIMATE: NORMAL
PMV BLD AUTO: 12.8 FL (ref 8.1–13.5)
POTASSIUM SERPL-SCNC: 4.9 MMOL/L (ref 3.7–5.3)
RBC # BLD: 4.9 M/UL (ref 3.95–5.11)
RBC # BLD: NORMAL 10*6/UL
RETIC %: 1.2 % (ref 0.5–1.9)
RETIC HEMOGLOBIN: 31.3 PG (ref 28.2–35.7)
SEG NEUTROPHILS: 60 % (ref 36–65)
SEGMENTED NEUTROPHILS ABSOLUTE COUNT: 3.47 K/UL (ref 1.5–8.1)
SODIUM BLD-SCNC: 144 MMOL/L (ref 135–144)
T3 FREE: 2.6 PG/ML (ref 2.02–4.43)
T4 TOTAL: 8.5 UG/DL (ref 4.5–12)
TOTAL CK: 95 U/L (ref 26–192)
TOTAL IRON BINDING CAPACITY: 265 UG/DL (ref 250–450)
TOTAL PROTEIN: 7.7 G/DL (ref 6.4–8.3)
TRIGLYCERIDE, FASTING: 94 MG/DL
TSH SERPL DL<=0.05 MIU/L-ACNC: 2.17 MIU/L (ref 0.3–5)
UNSATURATED IRON BINDING CAPACITY: 199 UG/DL (ref 112–347)
VITAMIN B-12: 494 PG/ML (ref 232–1245)
VITAMIN D 25-HYDROXY: 54.6 NG/ML (ref 30–100)
VLDLC SERPL CALC-MCNC: ABNORMAL MG/DL (ref 1–30)
WBC # BLD: 5.8 K/UL (ref 3.5–11.3)
WBC # BLD: NORMAL 10*3/UL

## 2019-05-24 PROCEDURE — 36415 COLL VENOUS BLD VENIPUNCTURE: CPT

## 2019-05-24 PROCEDURE — 84443 ASSAY THYROID STIM HORMONE: CPT

## 2019-05-24 PROCEDURE — 86800 THYROGLOBULIN ANTIBODY: CPT

## 2019-05-24 PROCEDURE — 84436 ASSAY OF TOTAL THYROXINE: CPT

## 2019-05-24 PROCEDURE — 80061 LIPID PANEL: CPT

## 2019-05-24 PROCEDURE — 84481 FREE ASSAY (FT-3): CPT

## 2019-05-24 PROCEDURE — 82607 VITAMIN B-12: CPT

## 2019-05-24 PROCEDURE — 82728 ASSAY OF FERRITIN: CPT

## 2019-05-24 PROCEDURE — 82550 ASSAY OF CK (CPK): CPT

## 2019-05-24 PROCEDURE — 83550 IRON BINDING TEST: CPT

## 2019-05-24 PROCEDURE — 85025 COMPLETE CBC W/AUTO DIFF WBC: CPT

## 2019-05-24 PROCEDURE — 82306 VITAMIN D 25 HYDROXY: CPT

## 2019-05-24 PROCEDURE — 80053 COMPREHEN METABOLIC PANEL: CPT

## 2019-05-24 PROCEDURE — 85045 AUTOMATED RETICULOCYTE COUNT: CPT

## 2019-05-24 PROCEDURE — 86376 MICROSOMAL ANTIBODY EACH: CPT

## 2019-05-24 PROCEDURE — 83540 ASSAY OF IRON: CPT

## 2019-05-28 LAB
THYROGLOBULIN AB: <20 IU/ML (ref 0–40)
THYROID PEROXIDASE (TPO) AB: 74.8 IU/ML (ref 0–35)

## 2019-07-02 ENCOUNTER — HOSPITAL ENCOUNTER (OUTPATIENT)
Dept: MRI IMAGING | Age: 64
Discharge: HOME | End: 2019-07-04
Payer: COMMERCIAL

## 2019-07-02 DIAGNOSIS — G89.29 OTHER CHRONIC PAIN: ICD-10-CM

## 2019-07-02 PROCEDURE — 73721 MRI JNT OF LWR EXTRE W/O DYE: CPT

## 2019-12-23 ENCOUNTER — HOSPITAL ENCOUNTER (OUTPATIENT)
Dept: MAMMOGRAPHY | Age: 64
Discharge: HOME | End: 2019-12-25
Payer: COMMERCIAL

## 2019-12-23 DIAGNOSIS — Z12.31 ENCOUNTER FOR SCREENING MAMMOGRAM FOR BREAST CANCER: ICD-10-CM

## 2019-12-23 PROCEDURE — 77063 BREAST TOMOSYNTHESIS BI: CPT

## 2020-05-28 ENCOUNTER — TELEPHONE (OUTPATIENT)
Dept: INTERNAL MEDICINE | Age: 65
End: 2020-05-28

## 2020-05-28 NOTE — TELEPHONE ENCOUNTER
Patient was put on the wait list to be scheduled.  Left a voicemail for patient to call the office so the patient can be scheduled for a new medication management appt

## 2020-06-24 ENCOUNTER — HOSPITAL ENCOUNTER (OUTPATIENT)
Age: 65
Discharge: HOME | End: 2020-06-24
Payer: COMMERCIAL

## 2020-06-24 LAB
-: NORMAL
ABSOLUTE EOS #: 0.28 K/UL (ref 0–0.44)
ABSOLUTE IMMATURE GRANULOCYTE: <0.03 K/UL (ref 0–0.3)
ABSOLUTE LYMPH #: 1.64 K/UL (ref 1.1–3.7)
ABSOLUTE MONO #: 0.45 K/UL (ref 0.1–1.2)
ALBUMIN SERPL-MCNC: 4 G/DL (ref 3.5–5.2)
ALBUMIN/GLOBULIN RATIO: 1.4 (ref 1–2.5)
ALP BLD-CCNC: 47 U/L (ref 35–104)
ALT SERPL-CCNC: 10 U/L (ref 5–33)
AMORPHOUS: NORMAL
ANION GAP SERPL CALCULATED.3IONS-SCNC: 11 MMOL/L (ref 9–17)
AST SERPL-CCNC: 16 U/L
BACTERIA: NORMAL
BASOPHILS # BLD: 1 % (ref 0–2)
BASOPHILS ABSOLUTE: 0.03 K/UL (ref 0–0.2)
BILIRUB SERPL-MCNC: 0.32 MG/DL (ref 0.3–1.2)
BILIRUBIN URINE: NEGATIVE
BUN BLDV-MCNC: 12 MG/DL (ref 8–23)
BUN/CREAT BLD: ABNORMAL (ref 9–20)
CALCIUM SERPL-MCNC: 9.2 MG/DL (ref 8.6–10.4)
CASTS UA: NORMAL /LPF (ref 0–8)
CHLORIDE BLD-SCNC: 108 MMOL/L (ref 98–107)
CHOLESTEROL, FASTING: 215 MG/DL
CHOLESTEROL/HDL RATIO: 4.7
CO2: 23 MMOL/L (ref 20–31)
COLOR: YELLOW
COMMENT UA: ABNORMAL
CREAT SERPL-MCNC: 0.71 MG/DL (ref 0.5–0.9)
CRYSTALS, UA: NORMAL /HPF
DIFFERENTIAL TYPE: ABNORMAL
EOSINOPHILS RELATIVE PERCENT: 5 % (ref 1–4)
EPITHELIAL CELLS UA: NORMAL /HPF (ref 0–5)
ESTIMATED AVERAGE GLUCOSE: 120 MG/DL
GFR AFRICAN AMERICAN: >60 ML/MIN
GFR NON-AFRICAN AMERICAN: >60 ML/MIN
GFR SERPL CREATININE-BSD FRML MDRD: ABNORMAL ML/MIN/{1.73_M2}
GFR SERPL CREATININE-BSD FRML MDRD: ABNORMAL ML/MIN/{1.73_M2}
GLUCOSE BLD-MCNC: 87 MG/DL (ref 70–99)
GLUCOSE FASTING: 87 MG/DL (ref 70–99)
GLUCOSE URINE: NEGATIVE
HBA1C MFR BLD: 5.8 % (ref 4–6)
HCT VFR BLD CALC: 37.4 % (ref 36.3–47.1)
HDLC SERPL-MCNC: 46 MG/DL
HEMOGLOBIN: 12.1 G/DL (ref 11.9–15.1)
IMMATURE GRANULOCYTES: 0 %
KETONES, URINE: NEGATIVE
LDL CHOLESTEROL: 145 MG/DL (ref 0–130)
LEUKOCYTE ESTERASE, URINE: ABNORMAL
LYMPHOCYTES # BLD: 28 % (ref 24–43)
MCH RBC QN AUTO: 27.3 PG (ref 25.2–33.5)
MCHC RBC AUTO-ENTMCNC: 32.4 G/DL (ref 28.4–34.8)
MCV RBC AUTO: 84.2 FL (ref 82.6–102.9)
MONOCYTES # BLD: 8 % (ref 3–12)
MUCUS: NORMAL
NITRITE, URINE: NEGATIVE
NRBC AUTOMATED: 0 PER 100 WBC
OTHER OBSERVATIONS UA: NORMAL
PDW BLD-RTO: 14.1 % (ref 11.8–14.4)
PH UA: 5 (ref 5–8)
PLATELET # BLD: 226 K/UL (ref 138–453)
PLATELET ESTIMATE: ABNORMAL
PMV BLD AUTO: 12.1 FL (ref 8.1–13.5)
POTASSIUM SERPL-SCNC: 4.1 MMOL/L (ref 3.7–5.3)
PROTEIN UA: NEGATIVE
RBC # BLD: 4.44 M/UL (ref 3.95–5.11)
RBC # BLD: ABNORMAL 10*6/UL
RBC UA: NORMAL /HPF (ref 0–4)
RENAL EPITHELIAL, UA: NORMAL /HPF
SEG NEUTROPHILS: 58 % (ref 36–65)
SEGMENTED NEUTROPHILS ABSOLUTE COUNT: 3.41 K/UL (ref 1.5–8.1)
SODIUM BLD-SCNC: 142 MMOL/L (ref 135–144)
SPECIFIC GRAVITY UA: 1.01 (ref 1–1.03)
T3 FREE: 2.98 PG/ML (ref 2.02–4.43)
T4 TOTAL: 8 UG/DL (ref 4.5–12)
THYROGLOBULIN AB: <20 IU/ML (ref 0–40)
THYROID PEROXIDASE (TPO) AB: 108 IU/ML (ref 0–35)
THYROXINE, FREE: 1.07 NG/DL (ref 0.93–1.7)
TOTAL CK: 110 U/L (ref 26–192)
TOTAL PROTEIN: 6.8 G/DL (ref 6.4–8.3)
TRICHOMONAS: NORMAL
TRIGLYCERIDE, FASTING: 122 MG/DL
TSH SERPL DL<=0.05 MIU/L-ACNC: 4.75 MIU/L (ref 0.3–5)
TURBIDITY: CLEAR
URINE HGB: NEGATIVE
UROBILINOGEN, URINE: NORMAL
VLDLC SERPL CALC-MCNC: ABNORMAL MG/DL (ref 1–30)
WBC # BLD: 5.8 K/UL (ref 3.5–11.3)
WBC # BLD: ABNORMAL 10*3/UL
WBC UA: NORMAL /HPF (ref 0–5)
YEAST: NORMAL

## 2020-06-24 PROCEDURE — 83036 HEMOGLOBIN GLYCOSYLATED A1C: CPT

## 2020-06-24 PROCEDURE — 36415 COLL VENOUS BLD VENIPUNCTURE: CPT

## 2020-06-24 PROCEDURE — 81001 URINALYSIS AUTO W/SCOPE: CPT

## 2020-06-24 PROCEDURE — 84481 FREE ASSAY (FT-3): CPT

## 2020-06-24 PROCEDURE — 85025 COMPLETE CBC W/AUTO DIFF WBC: CPT

## 2020-06-24 PROCEDURE — 84439 ASSAY OF FREE THYROXINE: CPT

## 2020-06-24 PROCEDURE — 82550 ASSAY OF CK (CPK): CPT

## 2020-06-24 PROCEDURE — 84436 ASSAY OF TOTAL THYROXINE: CPT

## 2020-06-24 PROCEDURE — 80053 COMPREHEN METABOLIC PANEL: CPT

## 2020-06-24 PROCEDURE — 86376 MICROSOMAL ANTIBODY EACH: CPT

## 2020-06-24 PROCEDURE — 82728 ASSAY OF FERRITIN: CPT

## 2020-06-24 PROCEDURE — 86800 THYROGLOBULIN ANTIBODY: CPT

## 2020-06-24 PROCEDURE — 84443 ASSAY THYROID STIM HORMONE: CPT

## 2020-06-24 PROCEDURE — 80061 LIPID PANEL: CPT

## 2020-06-25 LAB — FERRITIN: 181 UG/L (ref 13–150)

## 2020-09-17 ENCOUNTER — HOSPITAL ENCOUNTER (OUTPATIENT)
Age: 65
Discharge: HOME | End: 2020-09-17
Payer: COMMERCIAL

## 2020-09-17 ENCOUNTER — HOSPITAL ENCOUNTER (OUTPATIENT)
Dept: MAMMOGRAPHY | Age: 65
Discharge: HOME | End: 2020-09-19
Payer: COMMERCIAL

## 2020-09-17 LAB — CALCIUM SERPL-MCNC: 9.5 MG/DL (ref 8.6–10.4)

## 2020-09-17 PROCEDURE — 36415 COLL VENOUS BLD VENIPUNCTURE: CPT

## 2020-09-17 PROCEDURE — 82310 ASSAY OF CALCIUM: CPT

## 2020-09-17 PROCEDURE — 77080 DXA BONE DENSITY AXIAL: CPT

## 2020-10-05 NOTE — TELEPHONE ENCOUNTER
4th attempt- Patient was put on the wait list to be scheduled. Left a voicemail for patient to call the office so the patient can be scheduled for a New patient MM .  A letter was also mailed

## 2020-11-09 ENCOUNTER — TELEPHONE (OUTPATIENT)
Dept: INTERNAL MEDICINE | Age: 65
End: 2020-11-09

## 2020-11-09 NOTE — TELEPHONE ENCOUNTER
Patient was put on the wait list to be scheduled. Left a voicemail for patient to call the office so the patient can be scheduled for a New patient .

## 2021-10-26 ENCOUNTER — HOSPITAL ENCOUNTER (OUTPATIENT)
Dept: PHARMACY | Age: 66
Setting detail: THERAPIES SERIES
Discharge: HOME | End: 2021-10-26
Payer: COMMERCIAL

## 2021-10-26 PROCEDURE — 90732 PPSV23 VACC 2 YRS+ SUBQ/IM: CPT | Performed by: INTERNAL MEDICINE

## 2021-10-26 PROCEDURE — G0009 ADMIN PNEUMOCOCCAL VACCINE: HCPCS | Performed by: INTERNAL MEDICINE

## 2021-10-26 PROCEDURE — 6360000002 HC RX W HCPCS: Performed by: INTERNAL MEDICINE

## 2021-10-26 RX ADMIN — PNEUMOCOCCAL VACCINE POLYVALENT 0.5 ML
25; 25; 25; 25; 25; 25; 25; 25; 25; 25; 25; 25; 25; 25; 25; 25; 25; 25; 25; 25; 25; 25; 25 INJECTION, SOLUTION INTRAMUSCULAR; SUBCUTANEOUS at 14:20

## 2023-01-01 ENCOUNTER — APPOINTMENT (OUTPATIENT)
Dept: GENERAL RADIOLOGY | Age: 68
End: 2023-01-01
Payer: COMMERCIAL

## 2023-01-01 ENCOUNTER — HOSPITAL ENCOUNTER (EMERGENCY)
Age: 68
Discharge: HOME | End: 2023-01-01
Attending: EMERGENCY MEDICINE
Payer: COMMERCIAL

## 2023-01-01 VITALS
OXYGEN SATURATION: 98 % | WEIGHT: 124 LBS | DIASTOLIC BLOOD PRESSURE: 90 MMHG | BODY MASS INDEX: 21.17 KG/M2 | SYSTOLIC BLOOD PRESSURE: 146 MMHG | TEMPERATURE: 97.8 F | RESPIRATION RATE: 14 BRPM | HEIGHT: 64 IN | HEART RATE: 63 BPM

## 2023-01-01 DIAGNOSIS — S82.032A CLOSED DISPLACED TRANSVERSE FRACTURE OF LEFT PATELLA, INITIAL ENCOUNTER: Primary | ICD-10-CM

## 2023-01-01 PROCEDURE — 6370000000 HC RX 637 (ALT 250 FOR IP): Performed by: EMERGENCY MEDICINE

## 2023-01-01 PROCEDURE — 73562 X-RAY EXAM OF KNEE 3: CPT

## 2023-01-01 PROCEDURE — 99283 EMERGENCY DEPT VISIT LOW MDM: CPT

## 2023-01-01 RX ORDER — ROSUVASTATIN CALCIUM 5 MG/1
TABLET, COATED ORAL
COMMUNITY
Start: 2022-10-31

## 2023-01-01 RX ORDER — LEVOTHYROXINE SODIUM 0.05 MG/1
TABLET ORAL
COMMUNITY
Start: 2022-12-27

## 2023-01-01 RX ORDER — HYDROCODONE BITARTRATE AND ACETAMINOPHEN 5; 325 MG/1; MG/1
1 TABLET ORAL EVERY 6 HOURS PRN
Qty: 30 TABLET | Refills: 0 | Status: ON HOLD
Start: 2023-01-01 | End: 2023-01-05 | Stop reason: HOSPADM

## 2023-01-01 RX ORDER — HYDROCODONE BITARTRATE AND ACETAMINOPHEN 5; 325 MG/1; MG/1
2 TABLET ORAL ONCE
Status: COMPLETED | OUTPATIENT
Start: 2023-01-01 | End: 2023-01-01

## 2023-01-01 RX ORDER — TRAMADOL HYDROCHLORIDE 50 MG/1
50 TABLET ORAL ONCE
Status: COMPLETED | OUTPATIENT
Start: 2023-01-01 | End: 2023-01-01

## 2023-01-01 RX ADMIN — TRAMADOL HYDROCHLORIDE 50 MG: 50 TABLET, COATED ORAL at 17:42

## 2023-01-01 RX ADMIN — HYDROCODONE BITARTRATE AND ACETAMINOPHEN 2 TABLET: 5; 325 TABLET ORAL at 18:47

## 2023-01-01 ASSESSMENT — ENCOUNTER SYMPTOMS
DIARRHEA: 0
RHINORRHEA: 0
NAUSEA: 0
COLOR CHANGE: 0
SORE THROAT: 0
VOMITING: 0
EYE DISCHARGE: 0
EYE REDNESS: 0
COUGH: 0
SHORTNESS OF BREATH: 0

## 2023-01-01 ASSESSMENT — PAIN SCALES - GENERAL
PAINLEVEL_OUTOF10: 7
PAINLEVEL_OUTOF10: 7
PAINLEVEL_OUTOF10: 10

## 2023-01-01 ASSESSMENT — PAIN - FUNCTIONAL ASSESSMENT: PAIN_FUNCTIONAL_ASSESSMENT: 0-10

## 2023-01-01 NOTE — ED PROVIDER NOTES
EMERGENCY DEPARTMENT ENCOUNTER    Pt Name: Luis Ruelas  MRN: [id number]  [place] 1955  Date of evaluation: 1/1/23  CHIEF COMPLAINT       Chief Complaint   Patient presents with    Knee Injury     L side     HISTORY OF PRESENT ILLNESS   This is a 25-year-old female that presents with complaints of pain and discomfort in the left knee. Patient states that she was playing pickle ball, she slipped on the floor and landed on her left knee. Patient describes severe pain and discomfort and states that she felt and heard a large pop. She denies any other injuries, she has no chest pain or shortness of breath. She describes her symptoms as severe. REVIEW OF SYSTEMS     Review of Systems   Constitutional:  Negative for chills and fever. HENT:  Negative for rhinorrhea and sore throat. Eyes:  Negative for discharge, redness and visual disturbance. Respiratory:  Negative for cough and shortness of breath. Cardiovascular:  Negative for chest pain, palpitations and leg swelling. Gastrointestinal:  Negative for diarrhea, nausea and vomiting. Genitourinary:  Negative for dysuria and hematuria. Musculoskeletal:  Negative for arthralgias, myalgias and neck pain. Left knee pain   Skin:  Negative for color change and rash. Neurological:  Negative for seizures, weakness and headaches. Psychiatric/Behavioral:  Negative for hallucinations, self-injury and suicidal ideas. PASTMEDICAL HISTORY   History reviewed. No pertinent past medical history.   Past Problem List  Patient Active Problem List   Diagnosis Code    Trigger middle finger of right hand M65.331    Age-related osteoporosis without current pathological fracture M81.0    Iron deficiency E61.1     SURGICAL HISTORY       Past Surgical History:   Procedure Laterality Date    FINGER TRIGGER RELEASE Right 07/13/15     CURRENT MEDICATIONS       Previous Medications    LEVOTHYROXINE (SYNTHROID) 50 MCG TABLET    TAKE 1 TABLET BY MOUTH EVERY DAY IN THE MORNING ON EMPTY STOMACH FOR 90 DAYS    ROSUVASTATIN (CRESTOR) 5 MG TABLET    TAKE 1 TABLET BY MOUTH EVERY DAY     ALLERGIES     has No Known Allergies. FAMILY HISTORY     has no family status information on file. SOCIAL HISTORY       Social History     Tobacco Use    Smoking status: Never   Substance Use Topics    Alcohol use: No    Drug use: No     PHYSICAL EXAM     INITIAL VITALS: BP (!) 146/90   Pulse 63   Temp 97.8 °F (36.6 °C) (Oral)   Resp 14   Ht 5' 4\" (1.626 m)   Wt 124 lb (56.2 kg)   SpO2 98%   BMI 21.28 kg/m²    Physical Exam  Constitutional:       Appearance: Normal appearance. HENT:      Head: Normocephalic and atraumatic. Eyes:      Extraocular Movements: Extraocular movements intact. Pupils: Pupils are equal, round, and reactive to light. Cardiovascular:      Rate and Rhythm: Normal rate and regular rhythm. Pulmonary:      Effort: Pulmonary effort is normal.      Breath sounds: Normal breath sounds. Abdominal:      General: Abdomen is flat. Palpations: Abdomen is soft. Tenderness: There is no abdominal tenderness. Musculoskeletal:        Legs:    Neurological:      Mental Status: She is alert. MEDICAL DECISION MAKING / ED COURSE:   Summary of Patient Presentation:    68-year-old female, fall onto the left knee, plan is x-rays to evaluate for fracture. 1)  Number and Complexity of Problems  Problem List This Visit: Knee pain    Differential Diagnosis: Contusion, fracture, dislocation, sprain        Pertinent Comorbid Conditions: Osteopenia    2)  Data Reviewed      Imaging that is independently reviewed and interpreted by me as: Patient's x-ray was reviewed independently by me, it appears to have a fracture    See more data below for the lab and radiology tests and orders.     3)  Treatment and Disposition    I called and discussed the case with the patient's orthopedic surgeon, he recommended patient to place in a knee immobilizer, providing some pain medications and outpatient follow-up in his office on Wednesday. I discussed this with the patient and her , they are comfortable with the plan and stable for discharge. \"ED Course\" Notes From Epic Narrator:         CRITICAL CARE:         DATA FOR LAB AND RADIOLOGY TESTS ORDERED BELOW ARE REVIEWED BY THE ED CLINICIAN:    RADIOLOGY: All x-rays, CT, MRI, and formal ultrasound images (except ED bedside ultrasound) are read by the radiologist, see reports below, unless otherwise noted in MDM or here. Reports below are reviewed by myself. XR KNEE LEFT (3 VIEWS)   Final Result   Fracture of the lower patella, age is indeterminate however has occurred   since the prior exam of 2018. Cuco Sims Please correlate clinically             LABS: Lab orders shown below, the results are reviewed by myself, and all abnormals are listed below. Labs Reviewed - No data to display    Vitals Reviewed:    Vitals:    01/01/23 1706   BP: (!) 146/90   Pulse: 63   Resp: 14   Temp: 97.8 °F (36.6 °C)   TempSrc: Oral   SpO2: 98%   Weight: 124 lb (56.2 kg)   Height: 5' 4\" (1.626 m)     MEDICATIONS GIVEN TO PATIENT THIS ENCOUNTER:  Orders Placed This Encounter   Medications    traMADol (ULTRAM) tablet 50 mg    HYDROcodone-acetaminophen (NORCO) 5-325 MG per tablet 2 tablet    HYDROcodone-acetaminophen (NORCO) 5-325 MG per tablet     Sig: Take 1 tablet by mouth every 6 hours as needed for Pain for up to 14 days. Max Daily Amount: 4 tablets     Dispense:  30 tablet     Refill:  0     DISCHARGE PRESCRIPTIONS:  New Prescriptions    HYDROCODONE-ACETAMINOPHEN (NORCO) 5-325 MG PER TABLET    Take 1 tablet by mouth every 6 hours as needed for Pain for up to 14 days. Max Daily Amount: 4 tablets     PHYSICIAN CONSULTS ORDERED THIS ENCOUNTER:  IP CONSULT TO ORTHOPEDIC SURGERY  FINAL IMPRESSION      1.  Closed displaced transverse fracture of left patella, initial encounter          DISPOSITION/PLAN   DISPOSITION Decision To Discharge 01/01/2023 06:35:12 PM      OUTPATIENT FOLLOW UP THE PATIENT:  Regan Martin MD  [address] [place] [address] [address] [facility]  [phone]    Schedule an appointment as soon as possible for a visit in 2 days      Doyle Landau, MD  [address] [address] [address] [facility]  [phone]    Schedule an appointment as soon as possible for a visit on 1/4/2023      MD Davie Glover MD  01/01/23 7674

## 2023-01-04 ENCOUNTER — OFFICE VISIT (OUTPATIENT)
Dept: ORTHOPEDIC SURGERY | Age: 68
End: 2023-01-04
Payer: COMMERCIAL

## 2023-01-04 VITALS — BODY MASS INDEX: 21.17 KG/M2 | WEIGHT: 124 LBS | HEIGHT: 64 IN

## 2023-01-04 DIAGNOSIS — S82.002A CLOSED DISPLACED FRACTURE OF LEFT PATELLA, UNSPECIFIED FRACTURE MORPHOLOGY, INITIAL ENCOUNTER: Primary | ICD-10-CM

## 2023-01-04 PROCEDURE — 1123F ACP DISCUSS/DSCN MKR DOCD: CPT | Performed by: STUDENT IN AN ORGANIZED HEALTH CARE EDUCATION/TRAINING PROGRAM

## 2023-01-04 PROCEDURE — 99204 OFFICE O/P NEW MOD 45 MIN: CPT | Performed by: STUDENT IN AN ORGANIZED HEALTH CARE EDUCATION/TRAINING PROGRAM

## 2023-01-04 NOTE — PROGRESS NOTES
[facility]  [address] [address] [facility]  Dept Phone: [phone]  Dept Fax: [phone]      Orthopaedic Trauma New Patient      CHIEF COMPLAINT:    Chief Complaint   Patient presents with    Pain     Left knee pain       HISTORY OF PRESENT ILLNESS:    The patient is a 69 y.o. female who is being seen as a new patient for a left patella fracture that occurred 3 days ago. Patient presents today as a referral from Dr. Moises Zarco whom she saw this morning for her left patella fracture. Patient presents today with her . Patient states on Sunday, she was playing pickle ball and when she got done her foot got caught in the net and she tripped and fell landing directly onto the left knee. She had immediate pain and presented to the [facility] emergency department for further evaluation where x-rays revealed a left patella fracture. Patient was referred to Dr. Moises Zarco for follow-up. Patient states prior to this injury she did not have any left knee pain or any other orthopedic injuries in the past.  Surgical history: Trigger finger release in 2015. Denies any significant medical history including cardiac and pulmonary. She is not on any blood thinners. She is not a smoker. She is not a diabetic. States she is on medication for osteopenia and for hyperlipidemia. Otherwise she is generally a healthy and active individual. She currently works as a pharmacist.     Past Medical History:    No past medical history on file.     Past Surgical History:    Past Surgical History:   Procedure Laterality Date    FINGER TRIGGER RELEASE Right 07/13/15       Current Medications:   Current Outpatient Medications   Medication Sig Dispense Refill    levothyroxine (SYNTHROID) 50 MCG tablet TAKE 1 TABLET BY MOUTH EVERY DAY IN THE MORNING ON EMPTY STOMACH FOR 90 DAYS      rosuvastatin (CRESTOR) 5 MG tablet TAKE 1 TABLET BY MOUTH EVERY DAY      HYDROcodone-acetaminophen (Ashley Bame) 5-325 MG per tablet Take 1 tablet by mouth every 6 hours as needed for Pain for up to 14 days. Max Daily Amount: 4 tablets 30 tablet 0     No current facility-administered medications for this visit. Allergies:    Patient has no known allergies. Social History:   Social History     Socioeconomic History    Marital status:      Spouse name: Not on file    Number of children: Not on file    Years of education: Not on file    Highest education level: Not on file   Occupational History    Not on file   Tobacco Use    Smoking status: Never    Smokeless tobacco: Not on file   Substance and Sexual Activity    Alcohol use: No    Drug use: No    Sexual activity: Not on file   Other Topics Concern    Not on file   Social History Narrative    Not on file     Social Determinants of Health     Financial Resource Strain: Not on file   Food Insecurity: Not on file   Transportation Needs: Not on file   Physical Activity: Not on file   Stress: Not on file   Social Connections: Not on file   Intimate Partner Violence: Not on file   Housing Stability: Not on file       Family History:  No family history on file. REVIEW OF SYSTEMS:  Review of Systems    Gen: no fever, chills, malaise  CV: no chest pain or palpitations  Resp: no cough or shortness of breath  GI: no nausea, vomiting, diarrhea, or constipation  Neuro: no seizures, vertigo, or headaches  Msk: Left knee pain  10 remaining systems reviewed and negative      PHYSICAL EXAM:  Ht 5' 4\" (1.626 m)   Wt 124 lb (56.2 kg)   BMI 21.28 kg/m² Body mass index is 21.28 kg/m². Physical Exam  Gen: alert and oriented, no acute distress  Psych: Appropriate affect; Appropriate knowledge base; Appropriate mood; No hallucinations  Head: normocephalic atraumatic   Chest: symmetric chest excursion  Ortho Exam  LLE: Knee immobilizer brace in place. Swelling about the knee. TTP about the knee. No TTP about the hip, ankle or foot. Full range of motion of the ankle.  Unable to perform straight leg raise. Compartments soft. 2+ DP pulse. Deep and Superficial Peroneal/Saphenous/Sural SILT. Radiology:   No radiographs obtained today. Reviewed left knee x-rays obtained on 1/1/2023. ASSESSMENT:  69 y.o. female with a left patella fracture    PLAN:  - Reviewed x-rays with the patient and discussed the etiology of her injury. Discussed treatment options of non-operative vs operative management. - Based on her injury pattern and loss of extensor mechanism function, recommend surgical fixation. Discussed the surgery plan in detail including the risks, benefits, alternatives and expectations. Explained the post-operative course including beginning physical therapy after her 2 week visit. - All questions were answered to the patient's satisfaction and she wishes to proceed with surgery tomorrow 1/5/23. Consent obtained in office. Patient will follow up 2 weeks post op. Electronically signed by Joyce Velazquez DO on 1/4/2023 at 4:45 PM    This note is created with the assistance of a speech recognition program.  While intending to generate a document that actually reflects the content of the visit, the document can still have some errors including those of syntax and sound a like substitutions which may escape proof reading.   In such instances, actual meaning can be extrapolated by contextual diversion

## 2023-01-05 ENCOUNTER — APPOINTMENT (OUTPATIENT)
Dept: GENERAL RADIOLOGY | Age: 68
End: 2023-01-05
Attending: STUDENT IN AN ORGANIZED HEALTH CARE EDUCATION/TRAINING PROGRAM
Payer: COMMERCIAL

## 2023-01-05 ENCOUNTER — HOSPITAL ENCOUNTER (OUTPATIENT)
Age: 68
Setting detail: OUTPATIENT SURGERY
Discharge: HOME | End: 2023-01-05
Attending: STUDENT IN AN ORGANIZED HEALTH CARE EDUCATION/TRAINING PROGRAM | Admitting: STUDENT IN AN ORGANIZED HEALTH CARE EDUCATION/TRAINING PROGRAM
Payer: COMMERCIAL

## 2023-01-05 ENCOUNTER — ANESTHESIA (OUTPATIENT)
Dept: OPERATING ROOM | Age: 68
End: 2023-01-05
Payer: COMMERCIAL

## 2023-01-05 ENCOUNTER — ANESTHESIA EVENT (OUTPATIENT)
Dept: OPERATING ROOM | Age: 68
End: 2023-01-05
Payer: COMMERCIAL

## 2023-01-05 VITALS
OXYGEN SATURATION: 96 % | SYSTOLIC BLOOD PRESSURE: 135 MMHG | HEART RATE: 78 BPM | TEMPERATURE: 96.5 F | DIASTOLIC BLOOD PRESSURE: 80 MMHG | RESPIRATION RATE: 15 BRPM

## 2023-01-05 DIAGNOSIS — G89.18 ACUTE POST-OPERATIVE PAIN: Primary | ICD-10-CM

## 2023-01-05 PROBLEM — S82.002A CLOSED DISPLACED FRACTURE OF LEFT PATELLA: Status: ACTIVE | Noted: 2023-01-05

## 2023-01-05 PROCEDURE — 6360000002 HC RX W HCPCS: Performed by: ANESTHESIOLOGY

## 2023-01-05 PROCEDURE — 2500000003 HC RX 250 WO HCPCS: Performed by: NURSE ANESTHETIST, CERTIFIED REGISTERED

## 2023-01-05 PROCEDURE — 2580000003 HC RX 258: Performed by: STUDENT IN AN ORGANIZED HEALTH CARE EDUCATION/TRAINING PROGRAM

## 2023-01-05 PROCEDURE — 7100000010 HC PHASE II RECOVERY - FIRST 15 MIN: Performed by: STUDENT IN AN ORGANIZED HEALTH CARE EDUCATION/TRAINING PROGRAM

## 2023-01-05 PROCEDURE — 64447 NJX AA&/STRD FEMORAL NRV IMG: CPT | Performed by: ANESTHESIOLOGY

## 2023-01-05 PROCEDURE — 6360000002 HC RX W HCPCS: Performed by: STUDENT IN AN ORGANIZED HEALTH CARE EDUCATION/TRAINING PROGRAM

## 2023-01-05 PROCEDURE — 7100000001 HC PACU RECOVERY - ADDTL 15 MIN: Performed by: STUDENT IN AN ORGANIZED HEALTH CARE EDUCATION/TRAINING PROGRAM

## 2023-01-05 PROCEDURE — 6360000002 HC RX W HCPCS: Performed by: NURSE ANESTHETIST, CERTIFIED REGISTERED

## 2023-01-05 PROCEDURE — 27405 REPAIR OF KNEE LIGAMENT: CPT | Performed by: STUDENT IN AN ORGANIZED HEALTH CARE EDUCATION/TRAINING PROGRAM

## 2023-01-05 PROCEDURE — 3700000001 HC ADD 15 MINUTES (ANESTHESIA): Performed by: STUDENT IN AN ORGANIZED HEALTH CARE EDUCATION/TRAINING PROGRAM

## 2023-01-05 PROCEDURE — 6370000000 HC RX 637 (ALT 250 FOR IP): Performed by: ANESTHESIOLOGY

## 2023-01-05 PROCEDURE — 7100000011 HC PHASE II RECOVERY - ADDTL 15 MIN: Performed by: STUDENT IN AN ORGANIZED HEALTH CARE EDUCATION/TRAINING PROGRAM

## 2023-01-05 PROCEDURE — 2580000003 HC RX 258: Performed by: NURSE ANESTHETIST, CERTIFIED REGISTERED

## 2023-01-05 PROCEDURE — 3700000000 HC ANESTHESIA ATTENDED CARE: Performed by: STUDENT IN AN ORGANIZED HEALTH CARE EDUCATION/TRAINING PROGRAM

## 2023-01-05 PROCEDURE — 73562 X-RAY EXAM OF KNEE 3: CPT

## 2023-01-05 PROCEDURE — 27524 TREAT KNEECAP FRACTURE: CPT | Performed by: STUDENT IN AN ORGANIZED HEALTH CARE EDUCATION/TRAINING PROGRAM

## 2023-01-05 PROCEDURE — 3209999900 FLUORO FOR SURGICAL PROCEDURES

## 2023-01-05 PROCEDURE — 3600000004 HC SURGERY LEVEL 4 BASE: Performed by: STUDENT IN AN ORGANIZED HEALTH CARE EDUCATION/TRAINING PROGRAM

## 2023-01-05 PROCEDURE — 2500000003 HC RX 250 WO HCPCS: Performed by: ANESTHESIOLOGY

## 2023-01-05 PROCEDURE — 2709999900 HC NON-CHARGEABLE SUPPLY: Performed by: STUDENT IN AN ORGANIZED HEALTH CARE EDUCATION/TRAINING PROGRAM

## 2023-01-05 PROCEDURE — 7100000000 HC PACU RECOVERY - FIRST 15 MIN: Performed by: STUDENT IN AN ORGANIZED HEALTH CARE EDUCATION/TRAINING PROGRAM

## 2023-01-05 PROCEDURE — 3600000014 HC SURGERY LEVEL 4 ADDTL 15MIN: Performed by: STUDENT IN AN ORGANIZED HEALTH CARE EDUCATION/TRAINING PROGRAM

## 2023-01-05 RX ORDER — PROPOFOL 10 MG/ML
INJECTION, EMULSION INTRAVENOUS PRN
Status: DISCONTINUED | OUTPATIENT
Start: 2023-01-05 | End: 2023-01-05 | Stop reason: SDUPTHER

## 2023-01-05 RX ORDER — CEFAZOLIN SODIUM 1 G/3ML
INJECTION, POWDER, FOR SOLUTION INTRAMUSCULAR; INTRAVENOUS PRN
Status: DISCONTINUED | OUTPATIENT
Start: 2023-01-05 | End: 2023-01-05 | Stop reason: SDUPTHER

## 2023-01-05 RX ORDER — SODIUM CHLORIDE 9 MG/ML
INJECTION, SOLUTION INTRAVENOUS PRN
Status: DISCONTINUED | OUTPATIENT
Start: 2023-01-05 | End: 2023-01-05 | Stop reason: HOSPADM

## 2023-01-05 RX ORDER — OXYCODONE HYDROCHLORIDE AND ACETAMINOPHEN 5; 325 MG/1; MG/1
1 TABLET ORAL EVERY 6 HOURS PRN
Qty: 28 TABLET | Refills: 0 | Status: SHIPPED | OUTPATIENT
Start: 2023-01-05 | End: 2023-01-12

## 2023-01-05 RX ORDER — LIDOCAINE HYDROCHLORIDE 10 MG/ML
INJECTION, SOLUTION EPIDURAL; INFILTRATION; INTRACAUDAL; PERINEURAL PRN
Status: DISCONTINUED | OUTPATIENT
Start: 2023-01-05 | End: 2023-01-05 | Stop reason: SDUPTHER

## 2023-01-05 RX ORDER — TOBRAMYCIN 1.2 G/30ML
INJECTION, POWDER, LYOPHILIZED, FOR SOLUTION INTRAVENOUS PRN
Status: DISCONTINUED | OUTPATIENT
Start: 2023-01-05 | End: 2023-01-05 | Stop reason: ALTCHOICE

## 2023-01-05 RX ORDER — DEXAMETHASONE SODIUM PHOSPHATE 10 MG/ML
INJECTION, SOLUTION INTRAMUSCULAR; INTRAVENOUS PRN
Status: DISCONTINUED | OUTPATIENT
Start: 2023-01-05 | End: 2023-01-05 | Stop reason: SDUPTHER

## 2023-01-05 RX ORDER — MORPHINE SULFATE 2 MG/ML
2 INJECTION, SOLUTION INTRAMUSCULAR; INTRAVENOUS EVERY 5 MIN PRN
Status: DISCONTINUED | OUTPATIENT
Start: 2023-01-05 | End: 2023-01-05 | Stop reason: HOSPADM

## 2023-01-05 RX ORDER — OXYCODONE HYDROCHLORIDE AND ACETAMINOPHEN 5; 325 MG/1; MG/1
1 TABLET ORAL EVERY 6 HOURS PRN
Qty: 28 TABLET | Refills: 0 | Status: SHIPPED | OUTPATIENT
Start: 2023-01-05 | End: 2023-01-05 | Stop reason: SDUPTHER

## 2023-01-05 RX ORDER — FENTANYL CITRATE 50 UG/ML
25 INJECTION, SOLUTION INTRAMUSCULAR; INTRAVENOUS EVERY 5 MIN PRN
Status: DISCONTINUED | OUTPATIENT
Start: 2023-01-05 | End: 2023-01-05 | Stop reason: HOSPADM

## 2023-01-05 RX ORDER — PHENYLEPHRINE HYDROCHLORIDE 10 MG/ML
INJECTION INTRAVENOUS PRN
Status: DISCONTINUED | OUTPATIENT
Start: 2023-01-05 | End: 2023-01-05 | Stop reason: SDUPTHER

## 2023-01-05 RX ORDER — SCOLOPAMINE TRANSDERMAL SYSTEM 1 MG/1
1 PATCH, EXTENDED RELEASE TRANSDERMAL
Status: DISCONTINUED | OUTPATIENT
Start: 2023-01-05 | End: 2023-01-05 | Stop reason: HOSPADM

## 2023-01-05 RX ORDER — FENTANYL CITRATE 50 UG/ML
INJECTION, SOLUTION INTRAMUSCULAR; INTRAVENOUS PRN
Status: DISCONTINUED | OUTPATIENT
Start: 2023-01-05 | End: 2023-01-05 | Stop reason: SDUPTHER

## 2023-01-05 RX ORDER — MAGNESIUM HYDROXIDE 1200 MG/15ML
LIQUID ORAL CONTINUOUS PRN
Status: COMPLETED | OUTPATIENT
Start: 2023-01-05 | End: 2023-01-05

## 2023-01-05 RX ORDER — MIDAZOLAM HYDROCHLORIDE 2 MG/2ML
2 INJECTION, SOLUTION INTRAMUSCULAR; INTRAVENOUS ONCE
Status: COMPLETED | OUTPATIENT
Start: 2023-01-05 | End: 2023-01-05

## 2023-01-05 RX ORDER — ONDANSETRON 2 MG/ML
INJECTION INTRAMUSCULAR; INTRAVENOUS PRN
Status: DISCONTINUED | OUTPATIENT
Start: 2023-01-05 | End: 2023-01-05 | Stop reason: SDUPTHER

## 2023-01-05 RX ORDER — HYDROCODONE BITARTRATE AND ACETAMINOPHEN 5; 325 MG/1; MG/1
1 TABLET ORAL ONCE
Status: COMPLETED | OUTPATIENT
Start: 2023-01-05 | End: 2023-01-05

## 2023-01-05 RX ORDER — ROCURONIUM BROMIDE 10 MG/ML
INJECTION, SOLUTION INTRAVENOUS PRN
Status: DISCONTINUED | OUTPATIENT
Start: 2023-01-05 | End: 2023-01-05 | Stop reason: SDUPTHER

## 2023-01-05 RX ORDER — BUPIVACAINE HYDROCHLORIDE 5 MG/ML
30 INJECTION, SOLUTION EPIDURAL; INTRACAUDAL ONCE
Status: COMPLETED | OUTPATIENT
Start: 2023-01-05 | End: 2023-01-05

## 2023-01-05 RX ORDER — ONDANSETRON 2 MG/ML
4 INJECTION INTRAMUSCULAR; INTRAVENOUS EVERY 6 HOURS PRN
Status: DISCONTINUED | OUTPATIENT
Start: 2023-01-05 | End: 2023-01-05 | Stop reason: HOSPADM

## 2023-01-05 RX ORDER — SODIUM CHLORIDE 0.9 % (FLUSH) 0.9 %
5-40 SYRINGE (ML) INJECTION PRN
Status: DISCONTINUED | OUTPATIENT
Start: 2023-01-05 | End: 2023-01-05 | Stop reason: HOSPADM

## 2023-01-05 RX ORDER — CYCLOBENZAPRINE HCL 10 MG
10 TABLET ORAL 3 TIMES DAILY PRN
Qty: 21 TABLET | Refills: 0 | Status: SHIPPED | OUTPATIENT
Start: 2023-01-05 | End: 2023-01-15

## 2023-01-05 RX ORDER — SODIUM CHLORIDE 0.9 % (FLUSH) 0.9 %
5-40 SYRINGE (ML) INJECTION EVERY 12 HOURS SCHEDULED
Status: DISCONTINUED | OUTPATIENT
Start: 2023-01-05 | End: 2023-01-05 | Stop reason: HOSPADM

## 2023-01-05 RX ORDER — CYCLOBENZAPRINE HCL 10 MG
10 TABLET ORAL 3 TIMES DAILY PRN
Qty: 21 TABLET | Refills: 0 | Status: SHIPPED | OUTPATIENT
Start: 2023-01-05 | End: 2023-01-05 | Stop reason: SDUPTHER

## 2023-01-05 RX ORDER — DIPHENHYDRAMINE HYDROCHLORIDE 50 MG/ML
12.5 INJECTION INTRAMUSCULAR; INTRAVENOUS
Status: DISCONTINUED | OUTPATIENT
Start: 2023-01-05 | End: 2023-01-05 | Stop reason: HOSPADM

## 2023-01-05 RX ORDER — SODIUM CHLORIDE, SODIUM LACTATE, POTASSIUM CHLORIDE, CALCIUM CHLORIDE 600; 310; 30; 20 MG/100ML; MG/100ML; MG/100ML; MG/100ML
INJECTION, SOLUTION INTRAVENOUS CONTINUOUS PRN
Status: DISCONTINUED | OUTPATIENT
Start: 2023-01-05 | End: 2023-01-05 | Stop reason: SDUPTHER

## 2023-01-05 RX ORDER — TRANEXAMIC ACID 10 MG/ML
INJECTION, SOLUTION INTRAVENOUS PRN
Status: DISCONTINUED | OUTPATIENT
Start: 2023-01-05 | End: 2023-01-05 | Stop reason: SDUPTHER

## 2023-01-05 RX ORDER — OXYCODONE HYDROCHLORIDE AND ACETAMINOPHEN 5; 325 MG/1; MG/1
1 TABLET ORAL EVERY 6 HOURS PRN
Qty: 28 TABLET | Refills: 0 | OUTPATIENT
Start: 2023-01-05 | End: 2023-01-12

## 2023-01-05 RX ORDER — FENTANYL CITRATE 50 UG/ML
100 INJECTION, SOLUTION INTRAMUSCULAR; INTRAVENOUS ONCE
Status: COMPLETED | OUTPATIENT
Start: 2023-01-05 | End: 2023-01-05

## 2023-01-05 RX ORDER — ONDANSETRON 2 MG/ML
4 INJECTION INTRAMUSCULAR; INTRAVENOUS
Status: DISCONTINUED | OUTPATIENT
Start: 2023-01-05 | End: 2023-01-05 | Stop reason: HOSPADM

## 2023-01-05 RX ORDER — VANCOMYCIN HYDROCHLORIDE 1 G/20ML
INJECTION, POWDER, LYOPHILIZED, FOR SOLUTION INTRAVENOUS PRN
Status: DISCONTINUED | OUTPATIENT
Start: 2023-01-05 | End: 2023-01-05 | Stop reason: ALTCHOICE

## 2023-01-05 RX ADMIN — SUGAMMADEX 200 MG: 100 INJECTION, SOLUTION INTRAVENOUS at 13:48

## 2023-01-05 RX ADMIN — SODIUM CHLORIDE, POTASSIUM CHLORIDE, SODIUM LACTATE AND CALCIUM CHLORIDE: 600; 310; 30; 20 INJECTION, SOLUTION INTRAVENOUS at 14:06

## 2023-01-05 RX ADMIN — BUPIVACAINE HYDROCHLORIDE 125 MG: 5 INJECTION, SOLUTION EPIDURAL; INTRACAUDAL; PERINEURAL at 10:13

## 2023-01-05 RX ADMIN — CEFAZOLIN 2 G: 1 INJECTION, POWDER, FOR SOLUTION INTRAMUSCULAR; INTRAVENOUS at 12:47

## 2023-01-05 RX ADMIN — FENTANYL CITRATE 25 MCG: 50 INJECTION, SOLUTION INTRAMUSCULAR; INTRAVENOUS at 14:51

## 2023-01-05 RX ADMIN — LIDOCAINE HYDROCHLORIDE 60 MG: 10 INJECTION, SOLUTION EPIDURAL; INFILTRATION; INTRACAUDAL; PERINEURAL at 12:28

## 2023-01-05 RX ADMIN — TRANEXAMIC ACID 1000 MG: 10 INJECTION, SOLUTION INTRAVENOUS at 12:49

## 2023-01-05 RX ADMIN — MIDAZOLAM 1 MG: 1 INJECTION INTRAMUSCULAR; INTRAVENOUS at 10:13

## 2023-01-05 RX ADMIN — DEXAMETHASONE SODIUM PHOSPHATE 10 MG: 10 INJECTION, SOLUTION INTRAMUSCULAR; INTRAVENOUS at 12:51

## 2023-01-05 RX ADMIN — ONDANSETRON 4 MG: 2 INJECTION INTRAMUSCULAR; INTRAVENOUS at 13:32

## 2023-01-05 RX ADMIN — PHENYLEPHRINE HYDROCHLORIDE 100 MCG: 10 INJECTION INTRAVENOUS at 13:01

## 2023-01-05 RX ADMIN — PROPOFOL 150 MG: 10 INJECTION, EMULSION INTRAVENOUS at 12:28

## 2023-01-05 RX ADMIN — ROCURONIUM BROMIDE 30 MG: 10 INJECTION INTRAVENOUS at 12:28

## 2023-01-05 RX ADMIN — SODIUM CHLORIDE, POTASSIUM CHLORIDE, SODIUM LACTATE AND CALCIUM CHLORIDE: 600; 310; 30; 20 INJECTION, SOLUTION INTRAVENOUS at 12:15

## 2023-01-05 RX ADMIN — PHENYLEPHRINE HYDROCHLORIDE 100 MCG: 10 INJECTION INTRAVENOUS at 12:43

## 2023-01-05 RX ADMIN — HYDROCODONE BITARTRATE AND ACETAMINOPHEN 1 TABLET: 5; 325 TABLET ORAL at 14:52

## 2023-01-05 RX ADMIN — ROCURONIUM BROMIDE 10 MG: 10 INJECTION INTRAVENOUS at 13:11

## 2023-01-05 RX ADMIN — FENTANYL CITRATE 25 MCG: 50 INJECTION, SOLUTION INTRAMUSCULAR; INTRAVENOUS at 13:44

## 2023-01-05 RX ADMIN — FENTANYL CITRATE 25 MCG: 50 INJECTION, SOLUTION INTRAMUSCULAR; INTRAVENOUS at 12:47

## 2023-01-05 RX ADMIN — FENTANYL CITRATE 25 MCG: 50 INJECTION, SOLUTION INTRAMUSCULAR; INTRAVENOUS at 14:41

## 2023-01-05 RX ADMIN — FENTANYL CITRATE 50 MCG: 50 INJECTION, SOLUTION INTRAMUSCULAR; INTRAVENOUS at 10:13

## 2023-01-05 ASSESSMENT — PAIN SCALES - GENERAL
PAINLEVEL_OUTOF10: 6
PAINLEVEL_OUTOF10: 7
PAINLEVEL_OUTOF10: 4
PAINLEVEL_OUTOF10: 7

## 2023-01-05 ASSESSMENT — PAIN DESCRIPTION - DESCRIPTORS
DESCRIPTORS: TIGHTNESS
DESCRIPTORS: ACHING;DISCOMFORT
DESCRIPTORS: TIGHTNESS
DESCRIPTORS: TIGHTNESS

## 2023-01-05 ASSESSMENT — PAIN DESCRIPTION - LOCATION
LOCATION: KNEE

## 2023-01-05 ASSESSMENT — PAIN DESCRIPTION - ORIENTATION
ORIENTATION: LEFT
ORIENTATION: LEFT

## 2023-01-05 ASSESSMENT — PAIN - FUNCTIONAL ASSESSMENT: PAIN_FUNCTIONAL_ASSESSMENT: 0-10

## 2023-01-05 ASSESSMENT — PAIN DESCRIPTION - PAIN TYPE
TYPE: SURGICAL PAIN

## 2023-01-05 ASSESSMENT — LIFESTYLE VARIABLES: SMOKING_STATUS: 0

## 2023-01-05 NOTE — DISCHARGE INSTRUCTIONS
scopolamine Patch Home Instructions    1. Remove Scopolamine patch behind ear in 3 days  2. Wash hands before and after removing patch  3. This medication may cause headaches, dry mouth and/or dilated pupils       Orthopedic Instructions:  -Weight bearing status: as tolerated left leg in hinged knee brace locked in extension  -Maintain hinged knee brace at all times unless removing for hygiene. Important to keep leg straight.   -Keep dressing clean and dry. -Dress with plastic bag and rubber band to seal and repeat with second bag and rubber band when showering. \"Press & Seal\" wrap also works for sealing the rubber bag when showering.  -Starting 3 days after surgery, Ok for daily dressing changes until wound is dry. Then leave open to air. if wound is no longer leaking, Ok to shower but no soaks or baths. -Ice (20 minutes on and off 1 hour) and elevate (above heart) as needed for swelling/pain  -Drink plenty of fluids.  -Call the office or come to Emergency Room if signs of infection appear (hot, swollen, red, draining pus, fever)  -Take medications as prescribed.  -Wean off narcotics (percocet/norco) as soon as possible. Do not take tylenol if still taking narcotics. -No alcoholic beverages or driving/operating machinery while on narcotics  -Follow up with Dr. Lissette Chávez in his office in 14 days after surgery/discharge. Call [phone]  to schedule. No alcoholic beverages, no driving or operating machinery, no making important decisions for 24 hours. Children should maintain quiet play ( games, movies, books ) for 24 hours. You may have a normal diet but should eat lightly day of surgery. Drink plenty of fluids.   Urinate within 8 hours after surgery, if unable to urinate call your doctor

## 2023-01-05 NOTE — BRIEF OP NOTE
Brief Postoperative Note      Patient: Violeta Crane  YOB: 1955  MRN: [id number]    Date of Procedure: 1/5/2023    Pre-Op Diagnosis:   -Left inferior pole patella fracture    Post-Op Diagnosis:   -Left inferior pole patella fracture  -Left Retinaculum Tear        Procedure(s):  -Left patella ORIF  -Left knee capsule repair    Surgeon(s):  Lucas Carvalho DO    Assistant:  Resident: Reji Keene DO; Sandra Hammond DO, Joy Gonzalez DO    Anesthesia: General    Estimated Blood Loss (mL): 20cc    IVF: 1000cc crystalloid    Tourniquet time: 59 minutes    Complications: None    Specimens:   * No specimens in log *    Implants:  * No implants in log *      Drains: * No LDAs found *    Findings: See Op note for details.    Electronically signed by Lucas Carvalho DO on 1/5/2023 at 5:11 PM

## 2023-01-05 NOTE — ANESTHESIA PROCEDURE NOTES
Peripheral Block    Patient location during procedure: pre-op  Reason for block: post-op pain management and at surgeon's request  Start time: 1/5/2023 10:14 AM  End time: 1/5/2023 10:21 AM  Staffing  Anesthesiologist: Severino Naik MD  Preanesthetic Checklist  Completed: patient identified, IV checked, site marked, risks and benefits discussed, surgical/procedural consents, equipment checked, pre-op evaluation, timeout performed, anesthesia consent given, oxygen available and monitors applied/VS acknowledged  Peripheral Block   Patient position: supine  Prep: ChloraPrep  Provider prep: mask and sterile gloves  Patient monitoring: cardiac monitor, continuous pulse ox, frequent blood pressure checks and IV access  Block type: Femoral  Femoral crease  Laterality: left  Injection technique: single-shot  Guidance: ultrasound guided  Local infiltration: lidocaine  Infiltration strength: 1 %  Local infiltration: lidocaine  Dose: 3 mL    Needle   Needle type: short-bevel   Needle gauge: 20 G  Needle localization: ultrasound guidance  Test dose: negative  Needle length: 10 cm  Assessment   Injection assessment: negative aspiration for heme, no paresthesia on injection, local visualized surrounding nerve on ultrasound and no intravascular symptoms  Paresthesia pain: none  Slow fractionated injection: yes  Hemodynamics: stable  Outcomes: patient tolerated procedure well    Additional Notes  Versed 1 mg,fent 1 cc  25 cc PF .5% marcaine DD,neg asp

## 2023-01-05 NOTE — ANESTHESIA PRE PROCEDURE
Department of Anesthesiology  Preprocedure Note       Name:  Michael Shepard   Age:  69 y.o.  :  1955                                          MRN:  [id number]         Date:  2023      Surgeon: Lord Sales):  Wil Mireles DO    Procedure: Procedure(s):  OPEN REDUCTION INTERNAL FIXATION PATELLA FRACTURE VS. PATELLA TENDON REPAIR  ([address], SUPINE, C-ARM)    Medications prior to admission:   Prior to Admission medications    Medication Sig Start Date End Date Taking? Authorizing Provider   levothyroxine (SYNTHROID) 50 MCG tablet TAKE 1 TABLET BY MOUTH EVERY DAY IN THE MORNING ON EMPTY STOMACH FOR 90 DAYS 22   Historical Provider, MD   rosuvastatin (CRESTOR) 5 MG tablet TAKE 1 TABLET BY MOUTH EVERY DAY 10/31/22   Historical Provider, MD   HYDROcodone-acetaminophen (NORCO) 5-325 MG per tablet Take 1 tablet by mouth every 6 hours as needed for Pain for up to 14 days. Max Daily Amount: 4 tablets 1/1/23 1/15/23  Alfred Humphrey MD       Current medications:    Current Facility-Administered Medications   Medication Dose Route Frequency Provider Last Rate Last Admin    scopolamine (TRANSDERM-SCOP) transdermal patch 1 patch  1 patch TransDERmal Q72H Jo De La Cruz MD        ondansetron [facility]) injection 4 mg  4 mg IntraVENous Q6H PRN Jo De La Cruz MD           Allergies:  No Known Allergies    Problem List:    Patient Active Problem List   Diagnosis Code    Trigger middle finger of right hand M65.331    Age-related osteoporosis without current pathological fracture M81.0    Iron deficiency E61.1       Past Medical History:        Diagnosis Date    Hypothyroid        Past Surgical History:        Procedure Laterality Date    FINGER TRIGGER RELEASE Right 07/13/15       Social History:    Social History     Tobacco Use    Smoking status: Never    Smokeless tobacco: Not on file   Substance Use Topics    Alcohol use:  No                                Counseling given: Not Answered      Vital Signs (Current):   Vitals:    01/05/23 0852   BP: 128/71   Pulse: 67   Resp: 18   Temp: 97.5 °F (36.4 °C)   TempSrc: Temporal   SpO2: 100%                                              BP Readings from Last 3 Encounters:   01/05/23 128/71   01/01/23 (!) 146/90   12/01/17 (!) 118/57       NPO Status: Time of last liquid consumption: 2230                        Time of last solid consumption: 2230                        Date of last liquid consumption: 01/04/23                        Date of last solid food consumption: 01/04/23    BMI:   Wt Readings from Last 3 Encounters:   01/04/23 124 lb (56.2 kg)   01/01/23 124 lb (56.2 kg)   04/17/18 125 lb (56.7 kg)     There is no height or weight on file to calculate BMI.    CBC:   Lab Results   Component Value Date/Time    WBC 5.8 06/24/2020 08:50 AM    RBC 4.44 06/24/2020 08:50 AM    RBC 4.44 05/02/2012 12:31 PM    HGB 12.1 06/24/2020 08:50 AM    HCT 37.4 06/24/2020 08:50 AM    MCV 84.2 06/24/2020 08:50 AM    RDW 14.1 06/24/2020 08:50 AM     06/24/2020 08:50 AM     05/02/2012 12:31 PM       CMP:   Lab Results   Component Value Date/Time     06/24/2020 08:50 AM    K 4.1 06/24/2020 08:50 AM     06/24/2020 08:50 AM    CO2 23 06/24/2020 08:50 AM    BUN 12 06/24/2020 08:50 AM    CREATININE 0.71 06/24/2020 08:50 AM    GFRAA >60 06/24/2020 08:50 AM    LABGLOM >60 06/24/2020 08:50 AM    GLUCOSE 87 06/24/2020 08:50 AM    PROT 6.8 06/24/2020 08:50 AM    CALCIUM 9.5 09/17/2020 11:25 AM    BILITOT 0.32 06/24/2020 08:50 AM    ALKPHOS 47 06/24/2020 08:50 AM    AST 16 06/24/2020 08:50 AM    ALT 10 06/24/2020 08:50 AM       POC Tests: No results for input(s): POCGLU, POCNA, POCK, POCCL, POCBUN, POCHEMO, POCHCT in the last 72 hours.     Coags: No results found for: PROTIME, INR, APTT    HCG (If Applicable): No results found for: PREGTESTUR, PREGSERUM, HCG, HCGQUANT     ABGs: No results found for: PHART, PO2ART, LXK8YYU, IRG1LNG, BEART, P5LHDYJV Type & Screen (If Applicable):  No results found for: LABABO, LABRH    Drug/Infectious Status (If Applicable):  No results found for: HIV, HEPCAB    COVID-19 Screening (If Applicable): No results found for: COVID19        Anesthesia Evaluation   no history of anesthetic complications:   Airway: Mallampati: II     Neck ROM: full     Dental:          Pulmonary:       (-) recent URI and not a current smoker                           Cardiovascular:  Exercise tolerance: good (>4 METS),                     Neuro/Psych:      (-) seizures           GI/Hepatic/Renal:        (-) GERD       Endo/Other:    (+) hypothyroidism::., .    (-) diabetes mellitus               Abdominal:             Vascular: Other Findings:           Anesthesia Plan      general     ASA 1       Induction: intravenous.                             Severino Naik MD   1/5/2023

## 2023-01-05 NOTE — BRIEF OP NOTE
Brief Postoperative Note      Patient: Ray Last  YOB: 1955  MRN: [id number]    Date of Procedure: 1/5/2023    Pre-Op Diagnosis:   -Left inferior pole patella fracture    Post-Op Diagnosis:   -Left inferior pole patella fracture       Procedure:  -Left patella ORIF  -Left knee capsule repair    Surgeon(s):  Jammie Christianson DO    Assistant:  Resident: Sultana Cleevland DO; Fiona Lowe DO    Anesthesia: General, regional    Estimated Blood Loss (mL): 20    Fluids: 1L crystalloid    TT: ***    Complications: None    Specimens:   * No specimens in log *    Implants:  * No implants in log *      Drains: * No LDAs found *    Findings: See op note for details.     Electronically signed by Sultana Cleveland DO on 1/5/2023 at 1:34 PM

## 2023-01-05 NOTE — H&P
History and Physical    Pt Name: Oswaldo Terry  MRN: [id number]  YOB: 1955  Date of evaluation: 2023  Primary Care Physician: Ted Howell MD    SUBJECTIVE:   History of Chief Complaint:    Oswaldo Terry is a 69 y.o. female who is scheduled today for OPEN REDUCTION INTERNAL FIXATION PATELLA FRACTURE VS. PATELLA TENDON REPAIR  ([address], [address]PINE, C-ARM) - Left. Patient reports she was setting up a net to play pickle ball when her foot became caught in the netting, causing her to fall, mostly to her knees. Patient reports bilateral knee pain, left greater than right. She denies any LOC or any other injuries. Allergies  has No Known Allergies. Medications  Prior to Admission medications    Medication Sig Start Date End Date Taking? Authorizing Provider   levothyroxine (SYNTHROID) 50 MCG tablet TAKE 1 TABLET BY MOUTH EVERY DAY IN THE MORNING ON EMPTY STOMACH FOR 90 DAYS 22   Historical Provider, MD   rosuvastatin (CRESTOR) 5 MG tablet TAKE 1 TABLET BY MOUTH EVERY DAY 10/31/22   Historical Provider, MD   HYDROcodone-acetaminophen (NORCO) 5-325 MG per tablet Take 1 tablet by mouth every 6 hours as needed for Pain for up to 14 days. Max Daily Amount: 4 tablets 1/1/23 1/15/23  Leona Mccollum MD     Past Medical History    has a past medical history of Hypothyroid. Past Surgical History   has a past surgical history that includes Finger trigger release (Right, 07/13/15). Social History   reports that she has never smoked. She does not have any smokeless tobacco history on file. reports no history of alcohol use. reports no history of drug use. Marital Status   Children 2  Occupation pharmacist  Family History  Family Status   Relation Name Status    Mother      Father       family history includes COPD in her mother; No Known Problems in her father.     Review of Systems:  CONSTITUTIONAL:   negative for fevers, chills, fatigue and malaise EYES:   negative for double vision, blurred vision and photophobia    HEENT:   negative for tinnitus, epistaxis and sore throat     RESPIRATORY:   negative for cough, shortness of breath, wheezing     CARDIOVASCULAR:   negative for chest pain, palpitations, syncope, edema     GASTROINTESTINAL:   negative for nausea, vomiting     GENITOURINARY:   negative for incontinence     MUSCULOSKELETAL:   negative for neck or back pain left and right knee pain; left worse than right    NEUROLOGICAL:   Negative for weakness and tingling  negative for headaches and dizziness     PSYCHIATRIC:   negative for anxiety       OBJECTIVE:   VITALS:  temporal temperature is 97.5 °F (36.4 °C). Her blood pressure is 128/71 and her pulse is 67. Her respiration is 18 and oxygen saturation is 100%. CONSTITUTIONAL:alert & oriented x 3, no acute distress. Calm and pleasant. SKIN:  Warm and dry, no rashes to exposed areas of skin. HEAD:  Normocephalic, atraumatic. EYES: PERRL. EOMs intact. Wearing glasses. EARS:  Intact and equal bilaterally. No edema or thickening, without lumps, lesions, or discharge. Hearing grossly WNL. NOSE:  Nares patent. No rhinorrhea. MOUTH/THROAT:  Mucous membranes pink and moist, uvula midline, teeth appear to be intact. NECK: Supple, no lymphadenopathy. LUNGS: Respirations even and non-labored. Clear to auscultation bilaterally, no wheezes, rales, or rhonchi. CARDIOVASCULAR: Regular rate and rhythm, no murmurs/rubs/gallops. ABDOMEN: soft, non-tender and non-distended, bowel sounds active x 4. EXTREMITIES: left knee with edema and ecchymosis. No varicosities to bilateral lower extremities. NEUROLOGIC: CN II-XII are grossly intact. Gait not assessed. IMPRESSIONS:   LEFT PATELLA FRACTURE  PLANS:   OPEN REDUCTION INTERNAL FIXATION PATELLA FRACTURE VS. PATELLA TENDON REPAIR  ([address], [address], SUPINE, C-ARM) - Left.     PATRICIA Morales CNP   Electronically signed 1/5/2023 at 9:07 AM

## 2023-01-06 NOTE — OP NOTE
[address] [facility]                                OPERATIVE REPORT    PATIENT NAME: Lynn Ivy                :        1955  MED REC NO:   [id number]                             ROOM:  ACCOUNT NO:   [de-identified]                           ADMIT DATE: 2023  PROVIDER:     Tom Carvalho    DATE OF PROCEDURE:  2023    PREOPERATIVE DIAGNOSIS:  Left inferior pole patellar fracture. POSTOPERATIVE DIAGNOSES:  1. Left inferior pole patella fracture. 2.  Left knee retinaculum tear. PROCEDURES:  1. Open reduction and internal fixation of the left patella. 2.  Left knee capsular repair. SURGEON:  Tom Garza. DO Bryan    ASSISTANTS:  Isac Boyd DO, PGY-5; Jag Hammond DO, PGY-2;  Olman Gibbs DO, PGY-1.    ANESTHESIA:  General.    ESTIMATED BLOOD LOSS:  20 mL. FLUIDS:  1000 mL of crystalloid. TOURNIQUET TIME:  39 minutes. COMPLICATIONS:  None. SPECIMEN:  None. IMPLANTS:  None. FINDINGS:  Left inferior pole patellar fracture with retinacular tear. INDICATIONS:  This is a 78-year-old female who presented to my clinic on  2023 with left knee pain. She was referred to me from Dr. Michael Rios. She fell while playing pickleball, was found to have a left inferior  pole patellar fracture with loss of extensor mechanism. Given her loss  of extensor mechanism, I recommend operative intervention in the form of  open reduction and internal fixation of the left inferior pole of  Patella to restore extensor mechanism. Consent was obtained and placed in chart. All questions were  answered appropriately.   Surgical risks including, but not limited to  bleeding, blood clots, infection, damage to nearby tissues, vessels and  nerves; wound healing complications, failure of procedure, stiffness,  loss of motion, hardware failure, hardware irritation, patient  satisfaction, anesthesia risk, loss of limb, and loss of life were all  discussed with the patient. Knowing these risks, the patient wished to  proceed with surgery as indicated. OPERATIVE PROCEDURE:  The patient was taken to the operative suite,  placed under general anesthesia without any complications. A 2 gm of  Ancef and TXA were given prior to the procedure. At this time, all team  members paused to identify proper patient name, indications, site and  allergies. All team members were in agreeance. The left lower  extremity was prepped and draped in normal sterile fashion. Using a  marking pen, we mapped our incision to perform a direct anterior  approach to the left patella. Using gravity exsanguination, tourniquet  was inflated. The skin was incised and dissection was carried down  through the skin and subcutaneous tissues. The patellar fracture was  visualized, there was abundant comminution appreciated about the  inferior pole. The fracture edges were debrided. Hematoma was  evacuated. There was noted to be retinacular tears, both medially and  laterally about the patella. Once we had adequately debrided the  fracture site, it was noted that we were unable to obtain anatomic  reduction of the inferior pole of patella given the amount of  comminution. At this time, we used #5 FiberWire and Krackowed up and  down both medially and laterally on the patella and then passed at  initial stage within the patellar tendon itself. We then placed three  drill tunnels within the patella, passed the suture limbs up through the  drill tunnels and then tied them over the bony bridge proximally after  placing the leg in extension. After we had adequately tensioned, tied  and cut the sutures proximally, we repaired the retinaculum as well as  reinforced our patellar repair using #5 FiberWire in a pursestring  fashion. This also repaired the retinacular tear both medially and  laterally.   Being satisfied, final fluoroscopic images were taken. We  thoroughly irrigated the wound with normal saline. Antibiotic powder in  the form of vancomycin and tobramycin were applied. We closed the  fascial layer using 0 PDS, the deep dermal layers using 2-0 Monocryl and  the skin using 2-0 nylon in horizontal mattress fashion. Wound was then  dressed using Optifoam dressing. The patient was placed in a hinged  knee brace, locked in extension. The patient was then awoken from  general anesthesia and transferred to the PACU in stable condition. I  was present for all aspects of procedure. Meticulous dissection was  used and thorough hemostasis was achieved. The patient will be  weightbearing as tolerated in the left lower extremity with hinged knee  brace locked in extension until she follows with my clinic in 2 weeks.         Aggie Yost    D: 01/05/2023 17:17:41       T: 01/05/2023 17:21:08     [email or web address]  Job#: [id number]     Doc#: [id number]    CC:

## 2023-01-06 NOTE — ANESTHESIA POSTPROCEDURE EVALUATION
Department of Anesthesiology  Postprocedure Note    Patient: Namrata Starr  MRN: [id number]  YOB: 1955  Date of evaluation: 1/6/2023      Procedure Summary     Date: 01/05/23 Room / Location: [address]    Anesthesia Start: 1218 Anesthesia Stop: 4924    Procedure: OPEN REDUCTION INTERNAL FIXATION PATELLA FRACTURE ,LEFT KNEE CAPSULE REPAIR, C-ARM, ARTHREX (Left) Diagnosis:       Closed displaced fracture of left patella, unspecified fracture morphology, initial encounter      (LEFT PATELLA FRACTURE)    Surgeons: Vandana Cruz DO Responsible Provider: Jareth Yeboah MD    Anesthesia Type: general ASA Status: 1          Anesthesia Type: No value filed.     Nohelia Phase I: Nohelia Score: 10    Nohelia Phase II: Nohelia Score: 10      Anesthesia Post Evaluation    Patient location during evaluation: PACU  Patient participation: complete - patient participated  Level of consciousness: awake  Pain score: 4  Nausea & Vomiting: no vomiting  Cardiovascular status: hemodynamically stable  Respiratory status: room air

## 2023-01-23 ENCOUNTER — OFFICE VISIT (OUTPATIENT)
Dept: ORTHOPEDIC SURGERY | Age: 68
End: 2023-01-23

## 2023-01-23 VITALS — BODY MASS INDEX: 21.17 KG/M2 | HEIGHT: 64 IN | WEIGHT: 124 LBS

## 2023-01-23 DIAGNOSIS — S82.002D CLOSED DISPLACED FRACTURE OF LEFT PATELLA WITH ROUTINE HEALING, UNSPECIFIED FRACTURE MORPHOLOGY, SUBSEQUENT ENCOUNTER: Primary | ICD-10-CM

## 2023-01-23 DIAGNOSIS — G89.18 ACUTE POST-OPERATIVE PAIN: ICD-10-CM

## 2023-01-23 PROCEDURE — 99024 POSTOP FOLLOW-UP VISIT: CPT | Performed by: STUDENT IN AN ORGANIZED HEALTH CARE EDUCATION/TRAINING PROGRAM

## 2023-01-23 RX ORDER — OXYCODONE HYDROCHLORIDE AND ACETAMINOPHEN 5; 325 MG/1; MG/1
1 TABLET ORAL EVERY 6 HOURS PRN
Qty: 28 TABLET | Refills: 0 | Status: SHIPPED | OUTPATIENT
Start: 2023-01-23 | End: 2023-01-30

## 2023-01-23 RX ORDER — OXYCODONE HYDROCHLORIDE AND ACETAMINOPHEN 5; 325 MG/1; MG/1
1 TABLET ORAL EVERY 6 HOURS PRN
Qty: 28 TABLET | Refills: 0 | Status: SHIPPED | OUTPATIENT
Start: 2023-01-23 | End: 2023-01-23 | Stop reason: SDUPTHER

## 2023-01-23 NOTE — PROGRESS NOTES
[facility]  [phone] [facility]  Dept Phone: [phone]  Dept Fax: [phone]      [facility] Follow Up      Subjective:   Date of Surgery: 1/5/2023    Ammon Mallory is a 77y.o. year old female who presents to the clinic today for follow up status post open reduction internal fixation left patella with left knee capsular repair. Patient is accompanied by her . Patient overall is doing well. Denies any new injuries or falls. Patient states she has mostly been compliant with her restrictions. Patient has been weightbearing as tolerated with her hinged knee brace locked in extension with the assistance of a walker. Patient does admit to bending her knee slightly while using the restroom and is only occasionally wear her knee brace at night while sleeping. Denies any numbness or tingling. Overall she feels as if she is doing well. Review of Systems  Gen: no fever, chills, malaise  CV: no chest pain or palpitations  Resp: no cough or shortness of breath  GI: no nausea, vomiting, diarrhea, or constipation  Neuro: no seizures, vertigo, or headache  Msk: Left knee pain  10 remaining systems reviewed and negative    Objective : There were no vitals filed for this visit. Body mass index is 21.28 kg/m². General: No acute distress, resting comfortably in the clinic  Neuro: alert. oriented  Eyes: Extra-ocular muscles intact  Pulm: Respirations unlabored and regular. Skin: warm, well perfused  Psych:   Patient has good fund of knowledge and displays understanding of exam, diagnosis, and plan. MSK: Sutures in place. Wound well approximated. No erythema or signs of infection. Patient is able to perform a straight leg raise. EHL/FHL/TA/GSC motor intact. Patella tendon is palpable and is intact. Patient expresses normal sensation light touch L3-S1 distribution.     Radiology:  Imaging studies from today were independently reviewed and read as listed below. Any relevant images obtained prior to today's visit were also independently interpreted. History: 64-year-old female status post open reduction internal fixation left patella    Comparison: 1/5/2023    Findings: 3 views of the left knee (AP/lateral/oblique) in a skeletally mature patient showing redemonstration of inferior pole patella fracture with comminution about the inferior pole with bony fragments in relatively unchanged alignment from prior. No increase in separation of fragments when compared to prior films. No new acute abnormalities. Impression: Comminuted left inferior pole patella fracture     Assessment:   77y.o. year old female status post open reduction internal fixation left patella  Plan:   Lengthy discussion had with patient about current clinical state. Weight-bear as tolerated left lower extremity with hinged knee brace locked in extension. Physical therapy prescription written with specific instructions. Patient will be 0-30 degree range of motion for the next week. Physical therapy will unlock knee brace 10 degrees each week until she reaches 90 degrees. At that point patient may be full range of motion. No resisted strengthening left lower extremity. Patient is instructed to sleep in her hinged knee brace. Once her quad control returns, patient may discontinue brace use but this will be at physical therapy's discretion. Sutures removed. Steri-Strips applied. Okay to shower. No soaks or tubs. Pain medication prescription refilled. We will see patient back in 6 weeks or sooner if any acute issues arise. All questions answered. Patient is amenable to this plan.     Electronically signed by Phil Robles DO on 1/23/2023 at 3:28 PM    This note is created with the assistance of a speech recognition program.  While intending to generate a document that actually reflects the content of the visit, the document can still have some errors including those of syntax and sound a like substitutions which may escape proof reading.   In such instances, actual meaning can be extrapolated by contextual diversion

## 2023-01-26 ENCOUNTER — TELEPHONE (OUTPATIENT)
Dept: ADMINISTRATIVE | Age: 68
End: 2023-01-26

## 2023-01-26 DIAGNOSIS — S82.002D CLOSED DISPLACED FRACTURE OF LEFT PATELLA WITH ROUTINE HEALING, UNSPECIFIED FRACTURE MORPHOLOGY, SUBSEQUENT ENCOUNTER: Primary | ICD-10-CM

## 2023-01-26 NOTE — TELEPHONE ENCOUNTER
Patient called in requesting new referral for physical therapy, patient stated since her insurance has changed she can not go to [facility] for physical therapy anymore as they do not accept her insurance    Please advise.     Call back #: [phone]

## 2023-02-01 ENCOUNTER — HOSPITAL ENCOUNTER (OUTPATIENT)
Dept: PHYSICAL THERAPY | Facility: CLINIC | Age: 68
Setting detail: THERAPIES SERIES
Discharge: HOME | End: 2023-02-01
Payer: COMMERCIAL

## 2023-02-01 PROCEDURE — 97161 PT EVAL LOW COMPLEX 20 MIN: CPT

## 2023-02-01 PROCEDURE — 97110 THERAPEUTIC EXERCISES: CPT

## 2023-02-01 PROCEDURE — 97016 VASOPNEUMATIC DEVICE THERAPY: CPT

## 2023-02-01 NOTE — CONSULTS
[x] [facility] [place]  Outpatient Rehabilitation &  Therapy  [facility]   [place]: [phone]  F: [phone]      Physical Therapy Lower Extremity Evaluation    Date:  2023  Patient: Justin Owens   : 1955  MRN: [id number]  Physician: Dr. Pastor Shabazz: Marilyn Michael (Auth after eval)  Medical Diagnosis: Closed displaced fracture of left patella with routine healing, unspecified fracture morphology, subsequent encounter (S82.002D [ICD-10-CM])     Rehab Codes: M62.81, R26.89, R60.0, M25.66  Onset date: surgery 23    Next Dr's appt. : 3/13/23    Subjective:   CC/HPI: Patient is a 78 y/o female presenting s/p L patellar ORIF on 23. She reports that she tripped over a net while she was setting it up for Pickleball. Denies complications from surgery. She had a post-operative visit last week, surgeon believes she was doing well. Has removed her brace for sleep intermittently due to difficulty sleeping in the brace. Admits that he has removed her brace and walked short distances without her brace, but uses a cane. Arrives with rolling walker, brace locked in extension though slightly twisted.         PMHx: [] Unremarkable [] Diabetes [] HTN  [] Pacemaker   [] MI/Heart Problems [] Cancer [] Arthritis [] Other:              [x] Refer to full medical chart  In EPIC     Past Medical History          Diagnosis Date Comments     Hypothyroid [E03.9]           Past Surgical History         Laterality Date Comments   Finger trigger release [WIN029] Right 2015    Patella surgery [[id number]] Left 2023 OPEN REDUCTION INTERNAL FIXATION PATELLA FRACTURE ,LEFT KNEE CAPSULE REPAIR,   Ankle fracture surgery [[id number]] Left 2023 OPEN REDUCTION INTERNAL FIXATION PATELLA FRACTURE ,LEFT KNEE CAPSULE REPAIR, C-ARM, ARTHREX performed by Merrick Bettencourt DO at [facility] OR           Comorbidities:   [] Obesity [] Dialysis  [] N/A   [] Asthma/COPD [] Dementia [] Other:   [] Stroke [] Sleep apnea [] Other:   [] Vascular disease [] Rheumatic disease [] Other:     Tests:   [x] X-ray: 1/23/23     Comparison: 1/5/2023       Findings: 3 views of the left knee (AP/lateral/oblique) in a skeletally    mature patient showing redemonstration of inferior pole patella fracture    with comminution about the inferior pole with bony fragments in relatively    unchanged alignment from prior. No increase in separation of fragments    when compared to prior films. No new acute abnormalities.        Impression: Comminuted left inferior pole patella fracture       [] MRI:    [] Other:    Medications: [x] Refer to full medical record [] None [] Other:  Allergies:      [x] Refer to full medical record  [] None [] Other:    Function:  Hand Dominance  [x] Right  [] Left  Marital Status  Patient lives with  Lives with     Home type  Equipment 2 story - bedroom upstairs   Tub/shower combo with a shower chair    Stairs from outside 2 steps no railing   Stairs inside Full flight to [address] [place] [address]    Job status Currently off due to condition   Was working part-time   Work Activities/duties  Prolonged standing    Recreational Activities Pickleball, exercising        ADL/IADL [x] Previously independent with all [] Currently independent with all Who currently assists the patient with task    [] Previously independent with all except: [x] Currently independent with all except:    Bathing  [] Assist [x] Assist     Dress/grooming [] Assist [] Assist    Transfer/mobility [] Assist [] Assist    Feeding [] Assist [] Assist    Toileting [] Assist [] Assist    Driving [] Assist [x] Assist     Housekeeping [] Assist [x] Assist     Grocery shop/meal prep [] Assist [x] Assist           Gait Prior level of function Current level of function    [x] Independent  [] Assist [] Independent  [x] Assist   Device: [x] Independent [] Independent    [] Straight Cane [] Quad cane [x] Straight Cane [] Quad cane    [] Standard walker [] Rolling walker   [] 4 wheeled walker [] Standard walker [x] Rolling walker   [] 4 wheeled walker    [] Wheelchair [] Wheelchair       Pain present?  Yes    Location L knee    Pain Rating currently 6-7/10    Pain at worse 8/10    Pain at best 4/10    Description of pain Constant   Irritation at the area of the sutures    Altered Sensation Numbness to the 1st and 2nd digits of the L foot - started yesterday - resolved after she removed her brace    What makes it worse    What makes it better Medication - percocet    Symptom progression Improving since onset    Sleep Difficult to sleep           Objective:    ROM  ° A/P STRENGTH    Left Right Left Right   Hip Flex WFL WFL NT 5   Ext       ER       IR       ABD NT      ADD       Knee Flex 30 (limited per protocol WFL  5   Ext 0 WFL Strong quad set - no resistance applied per protocol  5   Ankle DF 5 10 4 5       OBSERVATION No Deficit Deficit Not Tested Comments   Posture       Forward Head [] [x] []    Rounded Shoulders [] [x] []    Kyphosis [] [x] []    Lordosis [] [] [x]    Lateral Shift [] [] [x]    Scoliosis [] [] [x]    Iliac Crest [] [] [x]    PSIS [] [] [x]    ASIS [] [] [x]    Genu Valgus [x] [] []    Genu Varus [x] [] []    Genu Recurvatum [x] [] []    Pronation [] [] [x]    Supination [] [] [x]    Leg Length Discrp [] [] [x]    Slumped Sitting [] [x] []    Palpation [] [x] [] Minimal reproducible pain to palpation    Sensation [x] [] []    Edema [] [x] [] L (cm)  Suprapatellar: 39  Midpatellar: 38  Infrapatellar: 33  R (cm)  Suprapatellar: 35  Midpatellar: 34  Infrapatellar: 31  Figure 8:   Neurological [x] [] []    Patellar Mobility [] [] [x]    Patellar Orientation [] [] [x]    Gait [] [x] [] Analysis: Hinged knee brace on the L locked in extension appropriately, using rolling walker for ambulation          Flexibility Normal Left tight Right tight Comments   Hip flexor [x] [] []    quad [x] [] []    HS [] [x] [x]    piriformis [x] [] []    ITB [x] [] []    gastroc [] [x] [x]        FUNCTION Normal Difficult Unable   Sitting [] [x] []   Standing [] [x] []   Ambulation [] [x] []   Groom/Dress [] [x] []   Lift/Carry [] [] [x]   Stairs [] [x] []   Bending [] [x] []   Squat [] [] [x]   Kneel [] [] [x]     BALANCE/PROPRIOCEPTION              [x] Not tested   Single leg stance       R                     L                                PAIN   Eyes open                             Sec. Sec                  . []    Eyes closed                          Sec. Sec                  . []        Functional Test: KOOS  Score: 86% functionally impaired     Comments:                                         [facility] Fall Risk Assessment    Patient Name:  Rhonda Vicente  : 1955    Risk Factor Scale  Score   History of Falls [] Yes  [x] No 25  0    Secondary Diagnosis [] Yes  [x] No 15  0    Ambulatory Aid [] Furniture  [x] Crutches/cane/walker  [] None/bedrest/wheelchair/nurse 30  15  0 15   IV/Heparin Lock [] Yes  [x] No 20  0    Gait/Transferring [] Impaired  [x] Weak  [] Normal/bedrest/immobile 20  10  0 10   Mental Status [] Forgets limitations  [x] Oriented to own ability 15  0       Total: 25     Based on the Assessment score: check the appropriate box.     []  No intervention needed   Low =   Score of 0-24    [x]  Use standard prevention interventions Moderate =  Score of 24-44   [x] Give patient handout and discuss fall prevention strategies   [x] Establish goal of education for patient/family RE: fall prevention strategies    []  Use high risk prevention interventions High = Score of 45 and higher   [] Give patient handout and discuss fall prevention strategies   [] Establish goal of education for patient/family Re: fall prevention strategies   [] Discuss lifeline / other resources    Electronically signed by:   Marbin Tang PT  Date: 2023        Assessment: 68 y/o female presents L patellar ORIF on 1/5/23. Hinged knee brace locked in extension upon arrival. She demonstrate strong quad set this date. Stressed importance of refraining from actively lifting her leg. She voices understanding. Stressed wearing the brace when standing at all times and when she is sleeping. Lengthy education provided on locking/unlocking her brace so she can being 0-30 degrees ROM. Moderate swelling present, applied vasocompression at end of session. Patient would benefit from skilled physical therapy services in order to: improve L knee ROM and strength, improve gait mechanics without an AD, and decrease pain to ease ADL's and improve quality of life     Problems:    [x] ? Pain:   [x] ? ROM:  [x] ? Strength:  [x] ? Function:  [] Other:         Goals  MET NOT MET ON-  GOING  Details   Date Addressed:        STG: To be met in 10 treatments           1. ? Pain: Decrease L knee pain levels to 3/10 with ADLs []  []  []      2. ? ROM: Increase R knee AROM to at least 0-70 deg (per protocol) to reduce difficulty with ADLs []  []  []      3. Patient to demonstrate improve LE flexibility on the LLE compared to the RLE to prepare for gait without AD  []  []  []     4. Independent with Home Exercise Programs []  []  []     5. Patient to demonstrate knowledge of fall prevention  []  []  []      []  []  []     Date Addressed:        LTG: To be met in 20 treatments       1. Improve score on assessment tool KOOS from 86% impairment to less than 50% impairment  []  []  []     2. Reduce L knee pain levels to 0/10 or less with ADLs []  []  []     3. ? ROM: Increase R knee AROM to at least 0-120 deg (per protocol) to reduce difficulty with ADLs []  []  []     4. Patient to demonstrate gait without hinged knee brace without deficits to ease ADL's  []  []  []     4.  LE strength 5/5 to ease return to sport/ADL's []  []  []                    Patient goals: walk without brace       Rehab Potential:  [x] Good  [] Fair  [] Poor   Suggested Professional Referral:  [x] No  [] Yes:  Barriers to Goal Achievement:  [x] No  [] Yes:  Domestic Concerns:  [x] No  [] Yes:    Pt. Education:  [x] Plans/Goals, Risks/Benefits discussed  [x] Home exercise program    Method of Education: [x] Verbal  [x] Demo  [x] Written  Lengthy education on brace locking/unlocking and precautions. Stressed importance of wearing the brace locked anytime she is standing. Discussed risks of flexion past the desired degree per protocol. Access Code: [email or web address]  URL: [email or web address]  Date: 02/01/2023  Prepared by: Deanne Vera    Exercises  Supine Ankle Pumps - 2-3 x daily - 7 x weekly - 3 sets - 10 reps  Long Sitting Quad Set - 2-3 x daily - 7 x weekly - 3 sets - 10 reps - 10 (sec) hold  Long Sitting Calf Stretch with Strap - 1-2 x daily - 7 x weekly - 3 sets - 30 (sec) hold  Seated Hamstring Stretch - 1-2 x daily - 7 x weekly - 3 sets - 30 sec hold  Supine Heel Slide - 1-2 x daily - 7 x weekly - 2 sets - 10 reps      Comprehension of Education:  [x] Verbalizes understanding. [x] Demonstrates understanding. [x] Needs Review. [] Demonstrates/verbalizes understanding of HEP/Ed previously given.     Treatment Plan:  [x] Therapeutic Exercise   19806  [] Iontophoresis: 4 mg/mL Dexamethasone Sodium Phosphate  mAmin  08965   [x] Therapeutic Activity  10024 [x] Vasopneumatic cold with compression  51583    [x] Gait Training   85629 [] Ultrasound   90132   [] Neuromuscular Re-education  15834 [] Electrical Stimulation Unattended  23190   [x] Manual Therapy  23955 [] Electrical Stimulation Attended  35870   [x] Instruction in HEP  [] Lumbar/Cervical Traction  03032   [] Aquatic Therapy   14723 [] Cold/hotpack    [] Massage   38662      [] Dry Needling, 1 or 2 muscles  83202   [] Biofeedback, first 15 minutes   05876  [] Biofeedback, additional 15 minutes   97069 [] Dry Needling, 3 or more muscles  05842     []  Medication allergies reviewed for use of    Dexamethasone Sodium Phosphate 4mg/ml     with iontophoresis treatments. Pt is not allergic. Frequency:  1-2 x/week for 20  visits      Todays Treatment:  Modalities:   Precautions: Hinged knee brace - Brace on at all times with ambulation and locked in extension.    30 degrees flexion only starting 1/1/23   Increased 10 degrees each week (40 degrees flex 1/8/23)  NO straight leg raises  Exercises:  Exercise   Reps/ Time Weight/ Level Comments         NuStep    Brace unlocked only to allowed range per protocol          Calf Stretch  HEP     Hamstring Stretch  HEP     Ankle pumps       Quad Set  10x5\"           Heel Slides  x10  Only to 30 degrees this date, brace removed but ROM was measured , encouraged brace wear during heel slides at home    4-way hip    In parallel bars                                                  Other: vasocompression x10 min to the L knee 34 degrees supine wedge putting knee into full knee extension position     Specific Instructions for next treatment: progress per protocol       Evaluation Complexity:  History (Personal factors, comorbidities) [] 0 [x] 1-2 [] 3+   Exam (limitations, restrictions) [] 1-2 [] 3 [x] 4+   Clinical presentation (progression) [x] Stable [] Evolving  [] Unstable   Decision Making [x] Low [] Moderate [] High    [x] Low Complexity [] Moderate Complexity [] High Complexity       Treatment Charges: Mins Units   [x] Evaluation       [x]  Low       []  Moderate       []  High 20 1   []  Modalities     [x]  Ther Exercise 10 1   []  Manual Therapy     [x]  Ther Activities 5 --   []  Aquatics     [x]  Vasocompression 10 1   []  Other       TOTAL TREATMENT TIME: 45 min    Time in: 4:05p   Time Out:4:50p    Electronically signed by: Leonor Mario PT        Physician Signature:________________________________Date:__________________  By signing above or cosigning this note, I have reviewed this plan of care and certify a need for medically necessary rehabilitation services.      *PLEASE SIGN ABOVE AND FAX BACK ALL PAGES*

## 2023-02-06 ENCOUNTER — HOSPITAL ENCOUNTER (OUTPATIENT)
Dept: PHYSICAL THERAPY | Facility: CLINIC | Age: 68
Setting detail: THERAPIES SERIES
Discharge: HOME | End: 2023-02-06
Payer: COMMERCIAL

## 2023-02-06 PROCEDURE — 97110 THERAPEUTIC EXERCISES: CPT

## 2023-02-06 PROCEDURE — 97016 VASOPNEUMATIC DEVICE THERAPY: CPT

## 2023-02-06 NOTE — FLOWSHEET NOTE
[x] [facility]  Outpatient Rehabilitation &  Therapy  [facility]   [place]: [phone]  F: [phone]      Physical Therapy Daily Treatment Note    Date:  2023  Patient Name:  Jazmin Morrow    :  1955  MRN: [id number]  Physician: Dr. Shahriar Diallo: Michelle White 12 visits approved from 23 to 23  Medical Diagnosis: Closed displaced fracture of left patella with routine healing, unspecified fracture morphology, subsequent encounter (S82.002D [ICD-10-CM])                                     Rehab Codes: M62.81, R26.89, R60.0, M25.66  Onset date: surgery 23                                        Next Dr's appt. : 3/13/23  Visit# / total visits:     Cancels/No Shows: 0/0    Subjective:    Pain:  [] Yes  [x] No Location: L knee Pain Rating: (0-10 scale) 0/10  Pain altered Tx:  [x] No  [] Yes  Action:  Comments: Patient arrives with rolling walker and brace locked into extension states the incision is bothering her rubbing on her clothes and her knee brace keeps sliding down on her leg. Objective: Todays Treatment:  Modalities:   Precautions: Hinged knee brace - Brace on at all times with ambulation and locked in extension.    30 degrees flexion only starting 23   Increased 10 degrees each week (start 40 degrees flex 23)  NO straight leg raises  Exercises:  Exercise    Reps/ Time Weight/ Level Comments             NuStep   10'   Brace unlocked only to allowed range per protocol              Long sitting gastroc stretch 3x30\"     Ankle pumps  HEP       Quad Set  10x10\"   Knee brace off   Heel Slides  x20   Keep brace on          Parallel Bars      SB gastroc stretch  3x30\"     HS stool stretch  3x30\"     3-way hip  x10 Orange No flexion    Standing heel raises  2x10                                                                        Other: vasocompression x10 min to the L knee 34 degrees NO pressure supine wedge      Specific Instructions for next treatment: Increased 40 degrees flexion ob brace       Treatment Charges: Mins Units   []  Modalities     [x]  Ther Exercise 35 2   []  Manual Therapy     []  Ther Activities     []  Aquatics     [x]  Vasocompression 10 1   []  Other     Total Treatment time 45 3       Assessment: [x] Progressing toward goals. Progressed program per protocol with patient noting increased fatigue with 3-way hip. Reviewed HEP with good quad contraction and understands to begin placing full weight through her LLE while ambulating with rolling walker. Will continue with program and advance knee ROM to 40 degrees per protocol next visit with hinged knee brace. [] No change. [] Other:  [x] Patient would continue to benefit from skilled physical therapy services in order to:  improve L knee ROM and strength, improve gait mechanics without an AD, and decrease pain to ease ADL's and improve quality of life     STG/LTG  Goals  MET NOT MET ON-  GOING  Details   Date Addressed:            STG: To be met in 10 treatments            1. ? Pain: Decrease L knee pain levels to 3/10 with ADLs []  []  []      2. ? ROM: Increase R knee AROM to at least 0-70 deg (per protocol) to reduce difficulty with ADLs []  []  []      3. Patient to demonstrate improve LE flexibility on the LLE compared to the RLE to prepare for gait without AD  []  []  []      4. Independent with Home Exercise Programs []  []  []      5. Patient to demonstrate knowledge of fall prevention  []  []  []        []  []  []      Date Addressed:            LTG: To be met in 20 treatments           1. Improve score on assessment tool KOOS from 86% impairment to less than 50% impairment  []  []  []      2. Reduce L knee pain levels to 0/10 or less with ADLs []  []  []      3. ? ROM: Increase R knee AROM to at least 0-120 deg (per protocol) to reduce difficulty with ADLs []  []  []      4.  Patient to demonstrate gait without hinged knee brace without deficits to ease ADL's  []  []  []      4. LE strength 5/5 to ease return to sport/ADL's []  []  []                      Patient goals: walk without brace         Rehab Potential:  [x] Good  [] Fair  [] Poor   Suggested Professional Referral:  [x] No  [] Yes:  Barriers to Goal Achievement:  [x] No  [] Yes:  Domestic Concerns:  [x] No  [] Yes:    Pt. Education:  [x] Yes  [] No  [x] Reviewed Prior HEP/Ed  Method of Education: [x] Verbal  [x] Demo  [] Written  Comprehension of Education:  [x] Verbalizes understanding. [x] Demonstrates understanding. [] Needs review. [] Demonstrates/verbalizes HEP/Ed previously given. Plan: [x] Continue current frequency toward long and short term goals.           Time In: 10:05am            Time Out: 11:00am    Electronically signed by:  Ashley Salazar PTA

## 2023-02-08 ENCOUNTER — HOSPITAL ENCOUNTER (OUTPATIENT)
Dept: PHYSICAL THERAPY | Facility: CLINIC | Age: 68
Setting detail: THERAPIES SERIES
Discharge: HOME | End: 2023-02-08
Payer: COMMERCIAL

## 2023-02-08 PROCEDURE — 97110 THERAPEUTIC EXERCISES: CPT

## 2023-02-08 PROCEDURE — 97016 VASOPNEUMATIC DEVICE THERAPY: CPT

## 2023-02-08 NOTE — FLOWSHEET NOTE
[] [facility]) [facility] &  Therapy  [address]  [phone]  F: [phone] [] [address]  [address] [address]   [address]  P: [phone]  F: [phone] [] [address]  [address]  P: [phone]  F: [phone] [] [address]  P: [phone]  F: [phone] [x] [address]  [facility]   [address]   [place]: [phone]  F: [phone]      Physical Therapy Daily Treatment Note    Date:  2023  Patient Name:  Gia Haskins    :  1955  MRN: [id number]  Physician: Dr. Tk Pettit: Craven Cowden 12 visits approved from 23 to 23  Medical Diagnosis: Closed displaced fracture of left patella with routine healing, unspecified fracture morphology, subsequent encounter (S82.002D [ICD-10-CM])                                     Rehab Codes: M62.81, R26.89, R60.0, M25.66  Onset date: surgery 23                                        Next Dr's appt. : 3/13/23  Visit# / total visits: 3/12    Cancels/No Shows: 0/0    Subjective:    Pain:  [] Yes  [x] No Location: L knee Pain Rating: (0-10 scale) 0/10  Pain altered Tx:  [x] No  [] Yes  Action:  Comments: Patient arrives with rolling walker and brace unlocked stating she misunderstood that she had to have her brace locked into extension with gait. Patient reports significant soreness in knee form last visit. Objective: Todays Treatment:  Modalities:   Precautions: Hinged knee brace - Brace on at all times with ambulation and locked in extension.    40 degrees flexion only starting 23   Increased 10 degrees each week (start 50 degrees flex 2/15/23)  NO straight leg raises  Exercises:  Exercise    Reps/ Time Weight/ Level Comments             NuStep   10'   Brace unlocked only to allowed range per protocol              Long sitting gastroc stretch 3x30\"     Ankle pumps  HEP       Quad Set  10x10\"   Knee brace off   Heel Slides  x20   Keep brace on    SAQ x20 Gino Keep brace on          Parallel Bars      SB gastroc stretch  3x30\"     HS stool stretch  3x30\"     3-way hip bilat x20 Orange No flexion    Standing heel raises  2x10       Standing toe raises  2x10                                                               Other: vasocompression x10 min to the L knee 34 degrees NO pressure supine wedge      Specific Instructions for next treatment:       Treatment Charges: Mins Units   []  Modalities     [x]  Ther Exercise 35 2   []  Manual Therapy     []  Ther Activities     []  Aquatics     [x]  Vasocompression 10 1   []  Other     Total Treatment time 45 3       Assessment: [x] Progressing toward goals. Increased knee flexion ROM on brace to 40 degrees. Re-educated patient that she cannot perform an active SLR and she needs to have her brace locked into extension when ambulating due to patient arriving with brace unlocked when arriving. Demonstrated proper tension of straps when wearing brace due to patients brace sliding down from her straps being set too loose. Will continue to progress per protocol and increase to 50 degrees on her knee brace. Ended with vaso per patient request.         [] No change.      [] Other:  [x] Patient would continue to benefit from skilled physical therapy services in order to:  improve L knee ROM and strength, improve gait mechanics without an AD, and decrease pain to ease ADL's and improve quality of life     STG/LTG  Goals  MET NOT MET ON-  GOING  Details   Date Addressed:            STG: To be met in 10 treatments            1. ? Pain: Decrease L knee pain levels to 3/10 with ADLs []  []  []      2. ? ROM: Increase R knee AROM to at least 0-70 deg (per protocol) to reduce difficulty with ADLs []  []  []      3. Patient to demonstrate improve LE flexibility on the LLE compared to the RLE to prepare for gait without AD  []  []  []      4. Independent with Home Exercise Programs []  []  []      5. Patient to demonstrate knowledge of fall prevention  []  []  []        []  []  []      Date Addressed:            LTG: To be met in 20 treatments           1. Improve score on assessment tool KOOS from 86% impairment to less than 50% impairment  []  []  []      2. Reduce L knee pain levels to 0/10 or less with ADLs []  []  []      3. ? ROM: Increase R knee AROM to at least 0-120 deg (per protocol) to reduce difficulty with ADLs []  []  []      4. Patient to demonstrate gait without hinged knee brace without deficits to ease ADL's  []  []  []      4. LE strength 5/5 to ease return to sport/ADL's []  []  []                      Patient goals: walk without brace         Rehab Potential:  [x] Good  [] Fair  [] Poor   Suggested Professional Referral:  [x] No  [] Yes:  Barriers to Goal Achievement:  [x] No  [] Yes:  Domestic Concerns:  [x] No  [] Yes:    Pt. Education:  [x] Yes  [] No  [x] Reviewed Prior HEP/Ed  Method of Education: [x] Verbal  [x] Demo  [] Written  Comprehension of Education:  [x] Verbalizes understanding. [x] Demonstrates understanding. [] Needs review. [] Demonstrates/verbalizes HEP/Ed previously given. Plan: [x] Continue current frequency toward long and short term goals.           Time In: 1:00pm         Time Out: 2:00pm    Electronically signed by:  Michela Leahy PTA

## 2023-02-13 ENCOUNTER — HOSPITAL ENCOUNTER (OUTPATIENT)
Dept: PHYSICAL THERAPY | Facility: CLINIC | Age: 68
Setting detail: THERAPIES SERIES
Discharge: HOME | End: 2023-02-13
Payer: COMMERCIAL

## 2023-02-13 PROCEDURE — 97110 THERAPEUTIC EXERCISES: CPT

## 2023-02-13 PROCEDURE — 97016 VASOPNEUMATIC DEVICE THERAPY: CPT

## 2023-02-13 NOTE — FLOWSHEET NOTE
[x] [facility]  Outpatient Rehabilitation &  Therapy  [facility]   [place]: [phone]  F: [phone]      Physical Therapy Daily Treatment Note    Date:  2023  Patient Name:  Alysha Nguyen    :  1955  MRN: [id number]  Physician: Dr. Nella Bob: Lisa Mccormack 12 visits approved from 23 to 23  Medical Diagnosis: Closed displaced fracture of left patella with routine healing, unspecified fracture morphology, subsequent encounter (S82.002D [ICD-10-CM])                                     Rehab Codes: M62.81, R26.89, R60.0, M25.66  Onset date: surgery 23                                        Next Dr's appt. : 3/13/23  Visit# / total visits:     Cancels/No Shows: 0/0    Subjective:    Pain:  [] Yes  [x] No Location: L knee Pain Rating: (0-10 scale) 0/10  Pain altered Tx:  [x] No  [] Yes  Action:  Comments: Patient arrives reporting some pain on the anterior aspect of her L knee, believes it is from her stitches. Objective: Todays Treatment:  Modalities:   Precautions: Hinged knee brace - Brace on at all times with ambulation and locked in extension.    40 degrees flexion only starting 23   Increased 10 degrees each week (start 50 degrees flex 2/15/23)  NO straight leg raises  Exercises:  Exercise    Reps/ Time Weight/ Level Comments             NuStep   10'   Brace unlocked only to allowed range per protocol              Long sitting gastroc stretch 3x30\"     Ankle pumps  x20       Long sitting quad Set  10x10\"   Knee brace off   Heel Slides  x20   Keep brace on    SAQ x20 Gino Keep brace on          Parallel Bars      SB gastroc stretch  3x30\"     HS stool stretch  3x30\"     3-way hip bilat x20 Orange No flexion    Resisted TKE  10x10\" Lime     Standing heel raises  x20       Standing toe raises  x20                                                               Other: vasocompression x10 min to the L knee 34 degrees min pressure supine wedge        Specific Instructions for next treatment: continue the above, increase to 50 degrees knee flexion      Treatment Charges: Mins Units   []  Modalities     [x]  Ther Exercise 51 3   []  Manual Therapy     []  Ther Activities     []  Aquatics     [x]  Vasocompression 10 1   []  Other     Total Treatment time 61 4       Assessment: [x] Progressing toward goals. Patient with good tolerance to program. Continues to lift her leg throughout session, though she was frequently educated on the importance of refraining from lifting her leg to allow for appropriate healing. Adjusted the brace this date, as the hinges are too posterior on the patient's current brace. Reiterated ambulating with the brace locked, she can go up/down the steps at home as long as her brace is locked in extension. Discussed step-to stepping pattern. [] No change. [] Other:  [x] Patient would continue to benefit from skilled physical therapy services in order to:  improve L knee ROM and strength, improve gait mechanics without an AD, and decrease pain to ease ADL's and improve quality of life     STG/LTG  Goals  MET NOT MET ON-  GOING  Details   Date Addressed:            STG: To be met in 10 treatments            1. ? Pain: Decrease L knee pain levels to 3/10 with ADLs []  []  []      2. ? ROM: Increase R knee AROM to at least 0-70 deg (per protocol) to reduce difficulty with ADLs []  []  []      3. Patient to demonstrate improve LE flexibility on the LLE compared to the RLE to prepare for gait without AD  []  []  []      4. Independent with Home Exercise Programs []  []  []      5. Patient to demonstrate knowledge of fall prevention  []  []  []        []  []  []      Date Addressed:            LTG: To be met in 20 treatments           1. Improve score on assessment tool KOOS from 86% impairment to less than 50% impairment  []  []  []      2.  Reduce L knee pain levels to 0/10 or less with ADLs []  [] []      3. ? ROM: Increase R knee AROM to at least 0-120 deg (per protocol) to reduce difficulty with ADLs []  []  []      4. Patient to demonstrate gait without hinged knee brace without deficits to ease ADL's  []  []  []      4. LE strength 5/5 to ease return to sport/ADL's []  []  []                      Patient goals: walk without brace         Rehab Potential:  [x] Good  [] Fair  [] Poor   Suggested Professional Referral:  [x] No  [] Yes:  Barriers to Goal Achievement:  [x] No  [] Yes:  Domestic Concerns:  [x] No  [] Yes:    Pt. Education:  [x] Yes  [] No  [x] Reviewed Prior HEP/Ed  Method of Education: [x] Verbal  [x] Demo  [] Written  Comprehension of Education:  [x] Verbalizes understanding. [x] Demonstrates understanding. [] Needs review. [] Demonstrates/verbalizes HEP/Ed previously given. Plan: [x] Continue current frequency toward long and short term goals.           Time In: 1:00pm         Time Out: 2:11pm    Electronically signed by:  Erna Omalley, PT

## 2023-02-15 ENCOUNTER — HOSPITAL ENCOUNTER (OUTPATIENT)
Dept: PHYSICAL THERAPY | Facility: CLINIC | Age: 68
Setting detail: THERAPIES SERIES
Discharge: HOME | End: 2023-02-15
Payer: COMMERCIAL

## 2023-02-15 PROCEDURE — 97110 THERAPEUTIC EXERCISES: CPT

## 2023-02-15 NOTE — FLOWSHEET NOTE
[x] [facility]  Outpatient Rehabilitation &  Therapy  [facility]   [place]: [phone]  F: [phone]      Physical Therapy Daily Treatment Note    Date:  2/15/2023  Patient Name:  Andrés Fong    :  1955  MRN: [id number]  Physician: Dr. Skyler Trimble: Doris Dougherty 12 visits approved from 23 to 23  Medical Diagnosis: Closed displaced fracture of left patella with routine healing, unspecified fracture morphology, subsequent encounter (S82.002D [ICD-10-CM])                                     Rehab Codes: M62.81, R26.89, R60.0, M25.66  Onset date: surgery 23                                        Next Dr's appt. : 3/13/23  Visit# / total visits:     Cancels/No Shows: 0/0    Subjective:    Pain:  [] Yes  [x] No Location: L knee Pain Rating: (0-10 scale) 0/10  Pain altered Tx:  [x] No  [] Yes  Action:  Comments: Patient arrives without pain complaints. Objective:   Todays Treatment:  Modalities:   Precautions: Hinged knee brace - Brace on at all times with ambulation and locked in extension  Increased 10 degrees each week (start 60 degrees flex 23)  NO straight leg raises  Exercises:  Exercise    Reps/ Time Weight/ Level Comments             NuStep   10'   Brace unlocked only to allowed range per protocol              Long sitting gastroc stretch 3x30\"     Ankle pumps  x20       Long sitting quad Set  HEP   Knee brace off   Heel Slides  HEP   Keep brace on    SAQ HEP Gino Keep brace on          Parallel Bars      SB gastroc stretch  3x30\"  Slantboard    HS stretch  3x30\"  Seated this date    3-way hip bilat x20 Orange No flexion    Resisted TKE  10x10\" Lime     Standing heel raises  x20       Standing toe raises  x20                                                               Other: vasocompression x10 min to the L knee 34 degrees min pressure supine wedge - held today        Specific Instructions for next treatment: continue the above, progress 3-way hip     Treatment Charges: Mins Units   []  Modalities     [x]  Ther Exercise 40 3   []  Manual Therapy     []  Ther Activities     []  Aquatics     []  Vasocompression     []  Other     Total Treatment time 40 3       Assessment: [x] Progressing toward goals. Patient with good tolerance to program. Increased range of motion per protocol to 50 degrees. She reports some stiffness but no pain. Continue to reiterate her protocol with hinged knee brace locked into extension when ambulating. Patient defers vasocompression this date. Discussed therapy frequency as she only was approved for 12 visits from the initial authorization. Patient would like to continue next week with 2 visits and possibly decrease frequency to 1x/week to save her visits for when she can begin to wean out of her hinged knee brace in 4 weeks or so.     [] No change. [] Other:  [x] Patient would continue to benefit from skilled physical therapy services in order to:  improve L knee ROM and strength, improve gait mechanics without an AD, and decrease pain to ease ADL's and improve quality of life     STG/LTG  Goals  MET NOT MET ON-  GOING  Details   Date Addressed:            STG: To be met in 10 treatments            1. ? Pain: Decrease L knee pain levels to 3/10 with ADLs []  []  []      2. ? ROM: Increase R knee AROM to at least 0-70 deg (per protocol) to reduce difficulty with ADLs []  []  []      3. Patient to demonstrate improve LE flexibility on the LLE compared to the RLE to prepare for gait without AD  []  []  []      4. Independent with Home Exercise Programs []  []  []      5. Patient to demonstrate knowledge of fall prevention  []  []  []        []  []  []      Date Addressed:            LTG: To be met in 20 treatments           1. Improve score on assessment tool KOOS from 86% impairment to less than 50% impairment  []  []  []      2.  Reduce L knee pain levels to 0/10 or less with ADLs [] []  []      3. ? ROM: Increase R knee AROM to at least 0-120 deg (per protocol) to reduce difficulty with ADLs []  []  []      4. Patient to demonstrate gait without hinged knee brace without deficits to ease ADL's  []  []  []      4. LE strength 5/5 to ease return to sport/ADL's []  []  []                      Patient goals: walk without brace         Rehab Potential:  [x] Good  [] Fair  [] Poor   Suggested Professional Referral:  [x] No  [] Yes:  Barriers to Goal Achievement:  [x] No  [] Yes:  Domestic Concerns:  [x] No  [] Yes:    Pt. Education:  [x] Yes  [] No  [x] Reviewed Prior HEP/Ed  Method of Education: [x] Verbal  [x] Demo  [] Written  Comprehension of Education:  [x] Verbalizes understanding. [x] Demonstrates understanding. [] Needs review. [] Demonstrates/verbalizes HEP/Ed previously given. Plan: [x] Continue current frequency toward long and short term goals.           Time In: 11:00am         Time Out: 11:50pm    Electronically signed by:  Clayton De La Torre, PT

## 2023-02-20 ENCOUNTER — HOSPITAL ENCOUNTER (OUTPATIENT)
Dept: PHYSICAL THERAPY | Facility: CLINIC | Age: 68
Setting detail: THERAPIES SERIES
Discharge: HOME | End: 2023-02-20
Payer: COMMERCIAL

## 2023-02-20 PROCEDURE — 97016 VASOPNEUMATIC DEVICE THERAPY: CPT

## 2023-02-20 PROCEDURE — 97110 THERAPEUTIC EXERCISES: CPT

## 2023-02-20 NOTE — FLOWSHEET NOTE
[x] [facility]  Outpatient Rehabilitation &  Therapy  [facility]   [place]: [phone]  F: [phone]      Physical Therapy Daily Treatment Note    Date:  2023  Patient Name:  Birdie Do    :  1955  MRN: [id number]  Physician: Dr. Medina Course: Walter Huddleston 12 visits approved from 23 to 23  Medical Diagnosis: Closed displaced fracture of left patella with routine healing, unspecified fracture morphology, subsequent encounter (S82.002D [ICD-10-CM])                                     Rehab Codes: M62.81, R26.89, R60.0, M25.66  Onset date: surgery 23                                        Next Dr's appt. : 3/13/23  Visit# / total visits:     Cancels/No Shows: 0/0    Subjective:    Pain:  [] Yes  [x] No Location: L knee Pain Rating: (0-10 scale) 6/10  Pain altered Tx:  [x] No  [] Yes  Action:  Comments: Patient arrives reporting 6/10 pain on the lateral aspect of her L knee. Some swelling noted today. Objective:   Todays Treatment:  Modalities:   Precautions: Hinged knee brace - Brace on at all times with ambulation and locked in extension  Increased 10 degrees each week (start 60 degrees flex 23)  NO straight leg raises  Exercises:  Exercise    Reps/ Time Weight/ Level Comments             NuStep   10'   Brace unlocked to 50 degrees - inc to 60 next   Charged 5 min for inc ROM and obtaining subjective info             Long sitting gastroc stretch      Ankle pumps         Long sitting quad Set  20x5\"      Heel Slides  x20   Only within range per protocol - 50 degrees this date    SAQ  Gino Keep brace on          Parallel Bars      SB gastroc stretch  3x30\"  Slantboard    HS stretch  3x30\"  Seated this date    3-way hip bilat x20 Orange No flexion    Resisted TKE  10x10\" Lime     Standing heel raises  x20       Standing toe raises  x20                                                               Other: vasocompression x10 min to the L knee 34 degrees min pressure supine wedge       Specific Instructions for next treatment: continue the above, progress 3-way hip     Treatment Charges: Mins Units   []  Modalities     [x]  Ther Exercise 40 3   []  Manual Therapy     []  Ther Activities     []  Aquatics     [x]  Vasocompression 10 1   []  Other     Total Treatment time 50 3       Assessment: [x] Progressing toward goals. Continued program, will increased flexion range to 60 degrees next session. Patient noted to have some compensatory hip activation with long sitting quad sets, tactile feedback used at the distal quad to improve contraction. Continues to require intermittent assist during session for brace locking/unlocking. [] No change. [] Other:  [x] Patient would continue to benefit from skilled physical therapy services in order to:  improve L knee ROM and strength, improve gait mechanics without an AD, and decrease pain to ease ADL's and improve quality of life     STG/LTG  Goals  MET NOT MET ON-  GOING  Details   Date Addressed:            STG: To be met in 10 treatments            1. ? Pain: Decrease L knee pain levels to 3/10 with ADLs []  []  []      2. ? ROM: Increase R knee AROM to at least 0-70 deg (per protocol) to reduce difficulty with ADLs []  []  []      3. Patient to demonstrate improve LE flexibility on the LLE compared to the RLE to prepare for gait without AD  []  []  []      4. Independent with Home Exercise Programs []  []  []      5. Patient to demonstrate knowledge of fall prevention  []  []  []        []  []  []      Date Addressed:            LTG: To be met in 20 treatments           1. Improve score on assessment tool KOOS from 86% impairment to less than 50% impairment  []  []  []      2. Reduce L knee pain levels to 0/10 or less with ADLs []  []  []      3. ? ROM: Increase R knee AROM to at least 0-120 deg (per protocol) to reduce difficulty with ADLs []  []  []      4.  Patient to demonstrate gait without hinged knee brace without deficits to ease ADL's  []  []  []      4. LE strength 5/5 to ease return to sport/ADL's []  []  []                      Patient goals: walk without brace         Rehab Potential:  [x] Good  [] Fair  [] Poor   Suggested Professional Referral:  [x] No  [] Yes:  Barriers to Goal Achievement:  [x] No  [] Yes:  Domestic Concerns:  [x] No  [] Yes:    Pt. Education:  [x] Yes  [] No  [x] Reviewed Prior HEP/Ed- continue previous HEP  Method of Education: [x] Verbal  [x] Demo  [] Written  Comprehension of Education:  [x] Verbalizes understanding. [x] Demonstrates understanding. [] Needs review. [] Demonstrates/verbalizes HEP/Ed previously given. Plan: [x] Continue current frequency toward long and short term goals.           Time In: 11:10am         Time Out: 12:05pm    Electronically signed by:  Dusty Fritz PT

## 2023-02-22 ENCOUNTER — HOSPITAL ENCOUNTER (OUTPATIENT)
Dept: PHYSICAL THERAPY | Facility: CLINIC | Age: 68
Setting detail: THERAPIES SERIES
Discharge: HOME | End: 2023-02-22
Payer: COMMERCIAL

## 2023-02-22 PROCEDURE — 97110 THERAPEUTIC EXERCISES: CPT

## 2023-02-22 PROCEDURE — 97016 VASOPNEUMATIC DEVICE THERAPY: CPT

## 2023-02-22 NOTE — FLOWSHEET NOTE
[x] [address]  Outpatient Rehabilitation &  Therapy  [facility]   [place]: [phone]  F: [phone]      Physical Therapy Daily Treatment Note    Date:  2023  Patient Name:  January Santos    :  1955  MRN: [id number]  Physician: Dr. Katheryn Vergara: Claude Mendez 12 visits approved from 23 to 23  Medical Diagnosis: Closed displaced fracture of left patella with routine healing, unspecified fracture morphology, subsequent encounter (S82.002D [ICD-10-CM])                                     Rehab Codes: M62.81, R26.89, R60.0, M25.66  Onset date: surgery 23                                        Next Dr's appt. : 3/13/23  Visit# / total visits:     Cancels/No Shows: 0/0    Subjective:    Pain:  [] Yes  [x] No Location: L knee Pain Rating: (0-10 scale) not rated/10  Pain altered Tx:  [x] No  [] Yes  Action:  Comments: Swelling has improved. Does not complain of pain on entrance. Objective:   Todays Treatment:  Modalities:   Precautions: Hinged knee brace - Brace on at all times with ambulation and locked in extension  Increased 10 degrees each week (start 70 degrees flex )  NO straight leg raises  Exercises:  Exercise    Reps/ Time Weight/ Level Comments             NuStep   10'   Brace unlocked to 60 degrees this date   Charged 5 min for inc ROM and obtaining subjective info             Long sitting gastroc stretch      Ankle pumps         Long sitting quad Set  20x5\"      Heel Slides  x20   Only within range per protocol - 60 degrees this date    SAQ  Gino Keep brace on          Parallel Bars      SB gastroc stretch  3x30\"  Slantboard    HS stretch  3x30\"  Seated this date    3-way hip bilat x20 Lime  No flexion    Resisted TKE  10x10\" Lime     Standing heel raises  x20       Standing toe raises  x20                                                               Other: vasocompression x10 min to the L knee 34 degrees min pressure supine wedge       Specific Instructions for next treatment: continue the above      Treatment Charges: Mins Units   []  Modalities     [x]  Ther Exercise 45 3   []  Manual Therapy     []  Ther Activities     []  Aquatics     [x]  Vasocompression 10 1   []  Other     Total Treatment time 55 4       Assessment: [x] Progressing toward goals. Increased flexion allowed in the brace when she is NWB. Tightened the brace. Patient continues to need brace education, as the straps are loose and medial dial is allowing more than the lateral dial. Swelling visually improved following vasocompression. To increase to 70 degrees next week. [] No change. [] Other:  [x] Patient would continue to benefit from skilled physical therapy services in order to:  improve L knee ROM and strength, improve gait mechanics without an AD, and decrease pain to ease ADL's and improve quality of life     STG/LTG  Goals  MET NOT MET ON-  GOING  Details   Date Addressed:            STG: To be met in 10 treatments            1. ? Pain: Decrease L knee pain levels to 3/10 with ADLs []  []  []      2. ? ROM: Increase R knee AROM to at least 0-70 deg (per protocol) to reduce difficulty with ADLs []  []  []      3. Patient to demonstrate improve LE flexibility on the LLE compared to the RLE to prepare for gait without AD  []  []  []      4. Independent with Home Exercise Programs []  []  []      5. Patient to demonstrate knowledge of fall prevention  []  []  []        []  []  []      Date Addressed:            LTG: To be met in 20 treatments           1. Improve score on assessment tool KOOS from 86% impairment to less than 50% impairment  []  []  []      2. Reduce L knee pain levels to 0/10 or less with ADLs []  []  []      3. ? ROM: Increase R knee AROM to at least 0-120 deg (per protocol) to reduce difficulty with ADLs []  []  []      4.  Patient to demonstrate gait without hinged knee brace without deficits to ease ADL's []  []  []      4. LE strength 5/5 to ease return to sport/ADL's []  []  []                      Patient goals: walk without brace         Rehab Potential:  [x] Good  [] Fair  [] Poor   Suggested Professional Referral:  [x] No  [] Yes:  Barriers to Goal Achievement:  [x] No  [] Yes:  Domestic Concerns:  [x] No  [] Yes:    Pt. Education:  [x] Yes  [] No  [x] Reviewed Prior HEP/Ed- continue previous HEP  Method of Education: [x] Verbal  [x] Demo  [] Written  Comprehension of Education:  [x] Verbalizes understanding. [x] Demonstrates understanding. [] Needs review. [] Demonstrates/verbalizes HEP/Ed previously given. Plan: [x] Continue current frequency toward long and short term goals.           Time In: 11:00 am         Time Out: 12:00pm      Electronically signed by:  Kamlesh Mehta PT

## 2023-02-27 ENCOUNTER — APPOINTMENT (OUTPATIENT)
Dept: PHYSICAL THERAPY | Facility: CLINIC | Age: 68
End: 2023-02-27
Payer: COMMERCIAL

## 2023-03-01 ENCOUNTER — HOSPITAL ENCOUNTER (OUTPATIENT)
Dept: PHYSICAL THERAPY | Facility: CLINIC | Age: 68
Setting detail: THERAPIES SERIES
Discharge: HOME | End: 2023-03-01
Payer: COMMERCIAL

## 2023-03-01 PROCEDURE — 97110 THERAPEUTIC EXERCISES: CPT

## 2023-03-01 PROCEDURE — 97016 VASOPNEUMATIC DEVICE THERAPY: CPT

## 2023-03-01 NOTE — FLOWSHEET NOTE
[x] [facility]  Outpatient Rehabilitation &  Therapy  [facility]   [place]: [phone]  F: [phone]      Physical Therapy Daily Treatment Note    Date:  3/1/2023  Patient Name:  Demarcus Smalls    :  1955  MRN: [id number]  Physician: Dr. Mary Britt: Dwight Soto 12 visits approved from 23 to 23  Medical Diagnosis: Closed displaced fracture of left patella with routine healing, unspecified fracture morphology, subsequent encounter (S82.002D [ICD-10-CM])                                     Rehab Codes: M62.81, R26.89, R60.0, M25.66  Onset date: surgery 23                                        Next Dr's appt. : 3/13/23  Visit# / total visits:     Cancels/No Shows: 0/0    Subjective:    Pain:  [] Yes  [x] No Location: L knee Pain Rating: (0-10 scale) 0/10  Pain altered Tx:  [x] No  [] Yes  Action:  Comments: Patient arrives stating continued swelling and her incision is bloody and crusty but no blood is coming out of her incisions. Patient states she feels like her stitches are going to tear when she performs heel slides. Objective:   Todays Treatment:  Modalities:   Precautions:   WBAT with brace locked in extension with all weight bearing exercises and gait   Increased 10 degrees each week (start 80 degrees flex 3/8/23)  NO straight leg raises    Exercises:  Exercise    Reps/ Time Weight/ Level Comments             NuStep   10'   Brace unlocked to 70 degrees this date              Mat      Long sitting quad Set  held      Heel Slides  held   Brace on unlocked to 70 degrees    SAQ held Gino Brace on    Clamshells  next     SL hip abduction  next           Parallel Bars      SB gastroc stretch  3x30\"     HS stretch  3x30\"     3-way hip bilat x20 Lime  No flexion    Resisted TKE  10x10\" Blue    Standing heel raises  x20       Standing toe raises  x20       Balance Board  3' L2     SLS 1x20\" LLE     SLS Rebounder Next                                  Other: Vasocompression x10 min to the L knee 34 degrees min pressure supine wedge       Specific Instructions for next treatment: Camshells, SL hip abduction, SLS rebounder       Treatment Charges: Mins Units   []  Modalities     [x]  Ther Exercise 30 2   []  Manual Therapy     []  Ther Activities     []  Aquatics     [x]  Vasocompression 10 1   []  Other     Total Treatment time 40 3       Assessment: [x] Progressing toward goals. Adjusted brace to 70 degrees knee flexion per protocol, patients' outer dial was set at 30 degrees with patient noting something pushes it in the car. Patient is able to hold a single leg stance on LLE for 20 seconds, therefore, will progress to rebounder next visit. Will increase increase to 80 degrees next week. Ended with vaso per patient request.       [] No change. [] Other:  [x] Patient would continue to benefit from skilled physical therapy services in order to:  improve L knee ROM and strength, improve gait mechanics without an AD, and decrease pain to ease ADL's and improve quality of life     STG/LTG  Goals  MET NOT MET ON-  GOING  Details   Date Addressed:            STG: To be met in 10 treatments            1. ? Pain: Decrease L knee pain levels to 3/10 with ADLs []  []  []      2. ? ROM: Increase R knee AROM to at least 0-70 deg (per protocol) to reduce difficulty with ADLs []  []  []      3. Patient to demonstrate improve LE flexibility on the LLE compared to the RLE to prepare for gait without AD  []  []  []      4. Independent with Home Exercise Programs []  []  []      5. Patient to demonstrate knowledge of fall prevention  []  []  []        []  []  []      Date Addressed:            LTG: To be met in 20 treatments           1. Improve score on assessment tool KOOS from 86% impairment to less than 50% impairment  []  []  []      2.  Reduce L knee pain levels to 0/10 or less with ADLs []  []  []      3. ? ROM: Increase R knee AROM to at least 0-120 deg (per protocol) to reduce difficulty with ADLs []  []  []      4. Patient to demonstrate gait without hinged knee brace without deficits to ease ADL's  []  []  []      4. LE strength 5/5 to ease return to sport/ADL's []  []  []                      Patient goals: walk without brace         Rehab Potential:  [x] Good  [] Fair  [] Poor   Suggested Professional Referral:  [x] No  [] Yes:  Barriers to Goal Achievement:  [x] No  [] Yes:  Domestic Concerns:  [x] No  [] Yes:    Pt. Education:  [x] Yes  [] No  [x] Reviewed Prior HEP/Ed- continue previous HEP  Method of Education: [x] Verbal  [x] Demo  [] Written  Comprehension of Education:  [x] Verbalizes understanding. [x] Demonstrates understanding. [] Needs review. [] Demonstrates/verbalizes HEP/Ed previously given. Plan: [x] Continue current frequency toward long and short term goals.           Time In: 11:10am         Time Out: 12:00pm      Electronically signed by:  Michelle Denton PTA

## 2023-03-03 ENCOUNTER — HOSPITAL ENCOUNTER (OUTPATIENT)
Dept: PHYSICAL THERAPY | Facility: CLINIC | Age: 68
Setting detail: THERAPIES SERIES
Discharge: HOME | End: 2023-03-03
Payer: COMMERCIAL

## 2023-03-03 PROCEDURE — 97110 THERAPEUTIC EXERCISES: CPT

## 2023-03-03 NOTE — FLOWSHEET NOTE
[x] [facility]  Outpatient Rehabilitation &  Therapy  [facility]   [place]: [phone]  F: [phone]      Physical Therapy Daily Treatment Note    Date:  3/3/2023  Patient Name:  Luciana Root    :  1955  MRN: [id number]  Physician: Dr. Samantha Tam: Georgina Wiley 12 visits approved from 23 to 23  Medical Diagnosis: Closed displaced fracture of left patella with routine healing, unspecified fracture morphology, subsequent encounter (S82.002D [ICD-10-CM])                                     Rehab Codes: M62.81, R26.89, R60.0, M25.66  Onset date: surgery 23                                        Next Dr's appt. : 3/13/23  Visit# / total visits:     Cancels/No Shows: 0/0    Subjective:    Pain:  [] Yes  [x] No Location: L knee Pain Rating: (0-10 scale) 0/10  Pain altered Tx:  [x] No  [] Yes  Action:  Comments: Patient arrives without complaints. Objective:   Todays Treatment:  Modalities:   Precautions:   WBAT with brace locked in extension with all weight bearing exercises and gait   Increased 10 degrees each week (start 80 degrees flex 3/8/23)  NO straight leg raises  Exercises:  Exercise    Reps/ Time Weight/ Level Comments             NuStep   10'   Brace unlocked to 70 degrees this date              Mat      Long sitting quad Set  held      Heel Slides  x20   Brace on unlocked to 70 degrees    SAQ held Gino Brace on    Clamshells  x20 A    SL hip abduction  next           Parallel Bars      SB gastroc stretch  3x30\"     HS stretch  3x30\"     3-way hip bilat x20 Lime  No flexion    Resisted TKE  10x10\" Blue    Standing heel raises  x20       Standing toe raises  x20       Balance Board  3' L2     SLS  LLE     SLS Rebounder  x20   Soccer ball                              Other: Vasocompression x10 min to the L knee 34 degrees min pressure supine wedge- not today        Specific Instructions for next treatment: progress range to 80 degrees       Treatment Charges: Mins Units   []  Modalities     [x]  Ther Exercise 48 3   []  Manual Therapy     []  Ther Activities     []  Aquatics     [x]  Vasocompression     []  Other     Total Treatment time 48 3       Assessment: [x] Progressing toward goals. Patient improving with her brace management, though she arrives with it slid down on her leg. She is independent with her HEP. She has a follow-up with Dr. Vu Marcos on 3/13/23. [] No change. [] Other:  [x] Patient would continue to benefit from skilled physical therapy services in order to:  improve L knee ROM and strength, improve gait mechanics without an AD, and decrease pain to ease ADL's and improve quality of life     STG/LTG  Goals  MET NOT MET ON-  GOING  Details   Date Addressed:            STG: To be met in 10 treatments            1. ? Pain: Decrease L knee pain levels to 3/10 with ADLs []  []  []      2. ? ROM: Increase R knee AROM to at least 0-70 deg (per protocol) to reduce difficulty with ADLs []  []  []      3. Patient to demonstrate improve LE flexibility on the LLE compared to the RLE to prepare for gait without AD  []  []  []      4. Independent with Home Exercise Programs []  []  []      5. Patient to demonstrate knowledge of fall prevention  []  []  []        []  []  []      Date Addressed:            LTG: To be met in 20 treatments           1. Improve score on assessment tool KOOS from 86% impairment to less than 50% impairment  []  []  []      2. Reduce L knee pain levels to 0/10 or less with ADLs []  []  []      3. ? ROM: Increase R knee AROM to at least 0-120 deg (per protocol) to reduce difficulty with ADLs []  []  []      4. Patient to demonstrate gait without hinged knee brace without deficits to ease ADL's  []  []  []      4.  LE strength 5/5 to ease return to sport/ADL's []  []  []                      Patient goals: walk without brace         Rehab Potential:  [x] Good  [] Fair  [] Poor Suggested Professional Referral:  [x] No  [] Yes:  Barriers to Goal Achievement:  [x] No  [] Yes:  Domestic Concerns:  [x] No  [] Yes:      Pt. Education:  [x] Yes  [] No  [x] Reviewed Prior HEP/Ed- continue previous HEP  Method of Education: [x] Verbal  [x] Demo  [] Written    Access Code: [email or web address]: [email or web address]/Date: 03/03/2023  Prepared by: Tejinder Wren  Exercises  Sidelying Hip Abduction - 1 x daily - 7 x weekly - 2 sets - 10 reps  Clamshell - 1 x daily - 7 x weekly - 2 sets - 10 reps      Comprehension of Education:  [x] Verbalizes understanding. [x] Demonstrates understanding. [] Needs review. [] Demonstrates/verbalizes HEP/Ed previously given. Plan: [x] Continue current frequency toward long and short term goals.           Time In: 10:57 am         Time Out: 11:48pm        Electronically signed by:  Hortensia Wilkerson, PT

## 2023-03-08 ENCOUNTER — HOSPITAL ENCOUNTER (OUTPATIENT)
Dept: PHYSICAL THERAPY | Facility: CLINIC | Age: 68
Setting detail: THERAPIES SERIES
Discharge: HOME | End: 2023-03-08
Payer: COMMERCIAL

## 2023-03-08 PROCEDURE — 97110 THERAPEUTIC EXERCISES: CPT

## 2023-03-13 ENCOUNTER — OFFICE VISIT (OUTPATIENT)
Dept: ORTHOPEDIC SURGERY | Age: 68
End: 2023-03-13

## 2023-03-13 VITALS — WEIGHT: 121 LBS | BODY MASS INDEX: 20.77 KG/M2

## 2023-03-13 DIAGNOSIS — S82.002D CLOSED DISPLACED FRACTURE OF LEFT PATELLA WITH ROUTINE HEALING, UNSPECIFIED FRACTURE MORPHOLOGY, SUBSEQUENT ENCOUNTER: Primary | ICD-10-CM

## 2023-03-13 PROCEDURE — 99024 POSTOP FOLLOW-UP VISIT: CPT | Performed by: STUDENT IN AN ORGANIZED HEALTH CARE EDUCATION/TRAINING PROGRAM

## 2023-03-13 NOTE — PROGRESS NOTES
[address]  [address] [facility] [address]  Dept: [phone]  Dept Fax: [phone]        [facility] Follow Up      Subjective:   Date of Surgery: Left patella ORIF on 2023    Leim Apgar is a 77y.o. year old female who presents to the clinic today for routine follow-up regarding the perioperative treatment listed above. She is 9-1/2 weeks from surgery. Overall she is doing very well and very happy with her results. She has some mild twinge to the lateral aspect of her knee on occasion that is improving. She denies any complications including falls wound issues. She has been doing physical therapy and has progressed to 80 degrees of knee flexion. She has been wearing her brace full-time including while sleeping. She uses a walker when going long distances but is ambulating without any assistive devices when at home. She is taking Tylenol for pain control. She denies any numbness or tingling to the left lower extremity. She denies any chest pain, or shortness of breath. She denies any other orthopedic complaint at this time. Review of Systems  Gen: no fever, chills, malaise  CV: no chest pain or palpitations  Resp: no cough or shortness of breath  GI: no nausea, vomiting, diarrhea, or constipation  Neuro: no numbness, tingling, or weakness  Msk: Left knee pain  10 remaining systems reviewed and negative    Objective : There were no vitals filed for this visit. Body mass index is 20.77 kg/m². General: No acute distress, resting comfortably in the clinic  Neuro: alert. oriented  Eyes: Extra-ocular muscles intact  Pulm: Respirations unlabored and regular. Skin: warm, well perfused  Psych:   Patient has good fund of knowledge and displays understanging of exam, diagnosis, and plan.   MSK: Left lower extremity: Incision to the anterior knee well approximated without dehiscence, drainage, purulence, or erythema. Mild eschar to inferior incision. No overt signs of infection. Knee AROM 5-110 degrees. Passive extension to 0.  5 out of 5 knee extension strength. No hip pain on log roll. Able to hold straight leg raise. No calf pain. Compartments soft. EHL/FHL/TA/GSC motor intact. Sensation intact to sural/saph/SPN/DPN distribution. Foot is warm and well perfused with BCR. Palpable intact patella tendon. Radiology:  History: Left patella ORIF    Comparison: 1/23/22    Findings: 3 views of the left knee (AP/lateral/oblique) in a skeletally mature patient re-demonstrating fracture of inferior pole patella fracture with comminution. Some mild further displacement and separation of the inferior fragments. There is increased callus formation throughout the fragments. No new acute abnormalities. No dislocations or subluxations noted. Impression: Comminuted left inferior pole patella fracture with routine healing     Assessment:   77y.o. year old female 9/2 months status post left knee  Plan:   -Significant time was spent with the patient discussing her clinical progression. At this point we will transition her to full range of motion.  -Continue weightbearing as tolerated to the left lower extremity  -She is okay to shower and soak her incision. She was encouraged for daily hygiene.  -A new physical therapy note was written for her in office today. She has been released for full range of motion of the left knee. She is okay for strength training to the left lower extremity. She is okay to discontinue her left knee brace once her quadriceps tendon has completely returned compared to contralateral side. This was explained in her therapy now and can be managed by physical therapy.  -Follow-up in 3 months for reevaluation.         Electronically signed by Airam Herbert DO on 3/13/2023 at 4:38 PM

## 2023-03-15 ENCOUNTER — HOSPITAL ENCOUNTER (OUTPATIENT)
Dept: PHYSICAL THERAPY | Facility: CLINIC | Age: 68
Setting detail: THERAPIES SERIES
Discharge: HOME | End: 2023-03-15
Payer: COMMERCIAL

## 2023-03-15 PROCEDURE — 97110 THERAPEUTIC EXERCISES: CPT

## 2023-03-15 NOTE — FLOWSHEET NOTE
[x] [facility]  Outpatient Rehabilitation &  Therapy  [facility]   [place]: [phone]  F: [phone]      Physical Therapy Daily Treatment Note    Date:  3/15/2023  Patient Name:  Oswaldo Terry    :  1955  MRN: [id number]  Physician: Dr. Kang Graft: Cornelius Nelson 12 visits approved from 23 to 23  Medical Diagnosis: Closed displaced fracture of left patella with routine healing, unspecified fracture morphology, subsequent encounter (S82.002D [ICD-10-CM])                                     Rehab Codes: M62.81, R26.89, R60.0, M25.66  Onset date: surgery 23                                        Next Dr's appt. : 3/13/23  Visit# / total visits:     Cancels/No Shows: 0/0    Subjective:    Pain:  [] Yes  [x] No Location: L knee Pain Rating: (0-10 scale) 0/10  Pain altered Tx:  [x] No  [] Yes  Action:  Comments: Patient arrives with brace donned. Only complains of pain when she is laying in bed. Objective: Todays Treatment:  Modalities:   Precautions: per most recent ortho note on 3/13/23   At this point we will transition her to full range of motion.  -Continue WBAT left lower extremity  -She has been released for full range of motion of the left knee. She is okay for strength training to the left lower extremity.  She is okay to discontinue her left knee brace once her quadriceps tendon has completely returned compared to contralateral side  Exercises:  Exercise    Reps/ Time Weight/ Level Comments             NuStep   10'   Brace unlocked to 80 degrees this date              Mat      Heel Slides  x10   Brace on unlocked to 80 degrees    SAQ      Clamshells   Active     SL hip abduction   Active     Quad sets  10x5\"x2     SLR  2x10  Added 3/15  AAROM as needed         Parallel Bars      SB gastroc stretch  3x30\"     HS stretch  3x30\"     Standing heel raises         Standing toe raises         Balance Board  3' L2     Marching  x30  Added 3/15         Gym      3-way hip x15 Lime No flexion    Resisted TKE 10x10\" Blue    SLS Rebounder forward x20   Soccer ball    Total Gym Squats  x20  L16 Depth within tolerance    Total Gym HR x20 L16         ROM  ° A/P STRENGTH     Left Right Left Right   Hip Flex [facility] WFL NT 5   Knee Flex 94 pain free   110 on total gym  WFL   5   Ext Lacking 2 WFL SLR without lag  5   Ankle DF 5 10 4 5       Gait []  [x]  []  Analysis: without hinged knee brace, patient continues to ambulate with knee locked in extension, improves with cueing        Edema   L (cm)  Suprapatellar: 38  Midpatellar: 38  Infrapatellar: 33    Specific Instructions for next treatment: progress strength program as able       Treatment Charges: Mins Units   []  Modalities     [x]  Ther Exercise 45 3   []  Manual Therapy     []  Ther Activities     []  Aquatics     [x]  Vasocompression     []  Other     Total Treatment time 45 3       Assessment: [x] Progressing toward goals. Progressions made this date, removing the knee brace for supine exercises to assess strength prior to completing program without the brace. She has good quadriceps strength on the L, able to complete SLR without a quad lag. Patient continues to require max cueing on exercise technique, having to review the quad setting and SLR at end of session. Recommended that she continue wearing her brace with community ambulating, though she can begin to walk at home without her brace. She voices understanding. [] No change.      [] Other:  [x] Patient would continue to benefit from skilled physical therapy services in order to:  improve L knee ROM and strength, improve gait mechanics without an AD, and decrease pain to ease ADL's and improve quality of life     STG/LTG  Goals  MET NOT MET ON-  GOING  Details   Date Addressed:            STG: To be met in 10 treatments            1. ? Pain: Decrease L knee pain levels to 3/10 with ADLs []  [x]  []  6/10 at times with sleeping     2. ? ROM: Increase R knee AROM to at least 0-70 deg (per protocol) to reduce difficulty with ADLs []  [x]  []  2-110 AAROM    3. Patient to demonstrate improve LE flexibility on the LLE compared to the RLE to prepare for gait without AD  [x]  []  []      4. Independent with Home Exercise Programs [x]  []  []      5. Patient to demonstrate knowledge of fall prevention  []  []  []        []  []  []      Date Addressed:            LTG: To be met in 20 treatments           1. Improve score on assessment tool KOOS from 86% impairment to less than 50% impairment  []  []  []      2. Reduce L knee pain levels to 0/10 or less with ADLs []  []  []      3. ? ROM: Increase R knee AROM to at least 0-120 deg (per protocol) to reduce difficulty with ADLs []  []  []      4. Patient to demonstrate gait without hinged knee brace without deficits to ease ADL's  []  []  []      4. LE strength 5/5 to ease return to sport/ADL's []  []  []                      Patient goals: walk without brace         Rehab Potential:  [x] Good  [] Fair  [] Poor   Suggested Professional Referral:  [x] No  [] Yes:  Barriers to Goal Achievement:  [x] No  [] Yes:  Domestic Concerns:  [x] No  [] Yes:      Pt. Education:  [x] Yes  [] No  [x] Reviewed Prior HEP/Ed- continue previous HEP  Method of Education: [x] Verbal  [x] Demo  [] Written    Access Code: Matty Baxter  URL: [employer]/  Date: 03/15/2023  Prepared by: Crispin Jarquin    Exercises  Long Sitting Quad Set - 1 x daily - 7 x weekly - 3 sets - 10 reps - 5 sec hold  Small Range Straight Leg Raise - 1 x daily - 7 x weekly - 3 sets - 10 reps  Standing March with Counter Support - 1 x daily - 7 x weekly - 3 sets - 10 reps    Access Code: [email or web address]  URL: [email or web address]  Date: 03/03/2023  Prepared by: Chasity Mojica  Exercises  Sidelying Hip Abduction - 1 x daily - 7 x weekly - 2 sets - 10 reps  Clamshell - 1 x daily - 7 x weekly - 2 sets - 10 reps      Comprehension of Education:  [x] Verbalizes understanding. [x] Demonstrates understanding. [] Needs review. [] Demonstrates/verbalizes HEP/Ed previously given. Plan: [x] Continue current frequency toward long and short term goals.             Time In: 11:02am         Time Out: 11:57am         Electronically signed by:  Ana Mckeon PT

## 2023-03-20 ENCOUNTER — HOSPITAL ENCOUNTER (OUTPATIENT)
Dept: PHYSICAL THERAPY | Facility: CLINIC | Age: 68
Setting detail: THERAPIES SERIES
Discharge: HOME | End: 2023-03-20
Payer: COMMERCIAL

## 2023-03-20 PROCEDURE — 97110 THERAPEUTIC EXERCISES: CPT

## 2023-03-20 NOTE — PROGRESS NOTES
without her brace at home starting 3/15/23, as she has regained her quadriceps strength. She continues to have some stiffness and decrease mobility with stair navigation and gait. Patient would continue to benefit from skilled physical therapy services in order to:  improve L knee ROM and strength, improve gait mechanics without an AD, and decrease pain to ease ADL's and improve quality of life s/p patellar ORIF   Goals  MET NOT MET ON-  GOING  Details   Date Addressed:  3/15/23           STG: To be met in 10 treatments            1. ? Pain: Decrease L knee pain levels to 3/10 with ADLs []  [x]  []  6/10 at times with sleeping     2. ? ROM: Increase R knee AROM to at least 0-70 deg (per protocol) to reduce difficulty with ADLs []  [x]  []  Lacking 2 degrees knee extension, otherwise met    3. Patient to demonstrate improve LE flexibility on the LLE compared to the RLE to prepare for gait without AD  [x]  []  []      4. Independent with Home Exercise Programs [x]  []  []      5. Patient to demonstrate knowledge of fall prevention  []  []  []        []  []  []      Date Addressed:            LTG: To be met in 20 treatments           1. Improve score on assessment tool KOOS from 86% impairment to less than 50% impairment  []  []  []   complete next session   2. Reduce L knee pain levels to 0/10 or less with ADLs []  []  []      3. ? ROM: Increase R knee AROM to at least 0-120 deg (per protocol) to reduce difficulty with ADLs []  [x]  []  2-110 degrees AAROM    4. Patient to demonstrate gait without hinged knee brace without deficits to ease ADL's  []  [x]  []   Continued gait impairments on entrance, wearing hinged knee brace intermittently    4.  LE strength 5/5 to ease return to sport/ADL's []  []  []                      Patient goals: walk without brace        Treatment Plan:  [x] Therapeutic Exercise   99415  [] Iontophoresis: 4 mg/mL Dexamethasone Sodium Phosphate  mAmin  72717   [] Therapeutic

## 2023-03-20 NOTE — FLOWSHEET NOTE
x20     Prone hip extension x20  Knee straight                Parallel Bars      SB gastroc stretch  3x30\"     HS stretch  3x30\"     Standing heel raises         Standing toe raises         Balance Board  3' L2     Marching    Added 3/15   Step ups  x15 4\"    Step downs  x15 4\"          Gym      3-way hip x15 Lime No flexion    Resisted TKE 10x10\" Blue    SLS Rebounder forward x20   Soccer ball    Total Gym Squats  x20  L20 Depth within tolerance    Total Gym HR x20 L20            Specific Instructions for next treatment: progress strength program as able       Treatment Charges: Mins Units   []  Modalities     [x]  Ther Exercise 40 3   []  Manual Therapy     []  Ther Activities     []  Aquatics     [x]  Vasocompression     []  Other     Total Treatment time 40 3       Assessment: [x] Progressing toward goals. Continued program outside of the hinged knee brace. Added step-ups with good control and minimal use of her UE required. Added prone exercises for posterior chain strengthening. Encouraged normal gait mechanics when she walks at home without the brace, as she continues to walk with knee in extension through entire phase of gait. [] No change. [] Other:  [x] Patient would continue to benefit from skilled physical therapy services in order to:  improve L knee ROM and strength, improve gait mechanics without an AD, and decrease pain to ease ADL's and improve quality of life     STG/LTG  Goals  MET NOT MET ON-  GOING  Details   Date Addressed:            STG: To be met in 10 treatments            1. ? Pain: Decrease L knee pain levels to 3/10 with ADLs []  [x]  []  6/10 at times with sleeping     2. ? ROM: Increase R knee AROM to at least 0-70 deg (per protocol) to reduce difficulty with ADLs []  [x]  []  2-110 AAROM    3. Patient to demonstrate improve LE flexibility on the LLE compared to the RLE to prepare for gait without AD  [x]  []  []      4.  Independent with Home Exercise Programs [x]  []  []

## 2023-03-22 ENCOUNTER — HOSPITAL ENCOUNTER (OUTPATIENT)
Dept: PHYSICAL THERAPY | Facility: CLINIC | Age: 68
Setting detail: THERAPIES SERIES
Discharge: HOME | End: 2023-03-22
Payer: COMMERCIAL

## 2023-03-22 PROCEDURE — 97110 THERAPEUTIC EXERCISES: CPT

## 2023-03-22 NOTE — FLOWSHEET NOTE
Addressed:            LTG: To be met in 20 treatments           1. Improve score on assessment tool KOOS from 86% impairment to less than 50% impairment  []  []  []      2. Reduce L knee pain levels to 0/10 or less with ADLs []  []  []      3. ? ROM: Increase R knee AROM to at least 0-120 deg (per protocol) to reduce difficulty with ADLs []  []  []      4. Patient to demonstrate gait without hinged knee brace without deficits to ease ADL's  []  []  []      4. LE strength 5/5 to ease return to sport/ADL's []  []  []                      Patient goals: walk without brace         Rehab Potential:  [x] Good  [] Fair  [] Poor   Suggested Professional Referral:  [x] No  [] Yes:  Barriers to Goal Achievement:  [x] No  [] Yes:  Domestic Concerns:  [x] No  [] Yes:      Pt. Education:  [x] Yes  [] No  [x] Reviewed Prior HEP/Ed- continue previous HEP  Method of Education: [x] Verbal  [x] Demo  [] Written    Access Code: Payton Stewart  URL: [employer]/  Date: 03/15/2023  Prepared by: Pranay Rhodes    Exercises  Long Sitting Quad Set - 1 x daily - 7 x weekly - 3 sets - 10 reps - 5 sec hold  Small Range Straight Leg Raise - 1 x daily - 7 x weekly - 3 sets - 10 reps  Standing March with Counter Support - 1 x daily - 7 x weekly - 3 sets - 10 reps    Access Code: [email or web address]  URL: [employer]/  Date: 03/03/2023  Prepared by: Devon Hernandez  Exercises  Sidelying Hip Abduction - 1 x daily - 7 x weekly - 2 sets - 10 reps  Clamshell - 1 x daily - 7 x weekly - 2 sets - 10 reps      Comprehension of Education:  [x] Verbalizes understanding. [x] Demonstrates understanding. [] Needs review. [] Demonstrates/verbalizes HEP/Ed previously given. Plan: [x] Continue current frequency toward long and short term goals.          Time In: 11:00am         Time Out: 11:56am      Electronically signed by:  Devon Hernandez PTA

## 2023-03-27 ENCOUNTER — HOSPITAL ENCOUNTER (OUTPATIENT)
Dept: PHYSICAL THERAPY | Facility: CLINIC | Age: 68
Setting detail: THERAPIES SERIES
Discharge: HOME | End: 2023-03-27
Payer: COMMERCIAL

## 2023-03-27 NOTE — PRE-CERTIFICATION NOTE
[] [facility] - [facility] &  Therapy  [address]    [phone]  F: [phone]   [] [address]  [facility] [address]   [address]  P: [phone]  F: [phone]  [] [address] &  Therapy  [address]  P: [phone]  F: [phone] [] [address]  P: [phone]  F: [phone]  [x] [address]  [address]   [address]   [place]: [phone]  F: [phone]   [] [facility]  [address]  [place]: [phone]   F: [phone]     Physical Therapy Cancel/No Show note    Date: 3/27/2023  Patient: Mitesh Middleton  : 1955  MRN: [id number]    Cancels/No Shows to date:     For today's appointment patient:    [x]  Cancelled    [] Rescheduled appointment    [] No-show     Reason given by patient:    []  Patient ill    []  Conflicting appointment    [] No transportation      [] Conflict with work    [] No reason given    [] Weather related    [] COVID-19    [x] Other:      Comments:  pending insurance verification.        [] Next appointment was confirmed    Electronically signed by: Matty Gandhi PT

## 2023-03-29 ENCOUNTER — HOSPITAL ENCOUNTER (OUTPATIENT)
Dept: PHYSICAL THERAPY | Facility: CLINIC | Age: 68
Setting detail: THERAPIES SERIES
Discharge: HOME | End: 2023-03-29
Payer: COMMERCIAL

## 2023-03-29 NOTE — PRE-CERTIFICATION NOTE
[x] [facility]  Outpatient Rehabilitation &  Therapy  [facility]   [place]: [phone]  F: [phone]      Physical Therapy Cancel/No Show note    Date: 3/29/2023  Patient: Flo Zuluaga  : 1955  MRN: [id number]    Cancels/No Shows to date:     For today's appointment patient:    [x]  Cancelled    [] Rescheduled appointment    [] No-show     Reason given by patient:    []  Patient ill    []  Conflicting appointment    [] No transportation      [] Conflict with work    [] No reason given    [] Weather related    [] [id number]    [x] Other:      Comments:  Approved for 8 additional visits, however, the date approved did not begin until 4/3/23, therefore, cancelled today's visits on 3/29/23 and will resume PT visits on April 4/3/23, 2x a week until the end of April.      Tyler Holm PT approval for 8 visits 4/3/23 to 23 [facility]#[id number]      [x] Next appointment was confirmed    Electronically signed by: Cirilo Kim PTA

## 2023-04-03 ENCOUNTER — HOSPITAL ENCOUNTER (OUTPATIENT)
Dept: PHYSICAL THERAPY | Facility: CLINIC | Age: 68
Setting detail: THERAPIES SERIES
Discharge: HOME | End: 2023-04-03
Payer: COMMERCIAL

## 2023-04-03 PROCEDURE — 97110 THERAPEUTIC EXERCISES: CPT

## 2023-04-03 NOTE — FLOWSHEET NOTE
[x] [facility]  Outpatient Rehabilitation &  Therapy  [facility]   Angela Loza: [phone]  F: [phone]      Physical Therapy Daily Treatment Note    Date:  4/3/2023  Patient Name:  Destiney Ramirez    :  1955  MRN: [id number]  Physician: Dr. Wilfrid Houston: Ascencion Mcgee 12 additional visits approved from 23 to 23  Medical Diagnosis: Closed displaced fracture of left patella with routine healing, unspecified fracture morphology, subsequent encounter (S82.002D [ICD-10-CM])                                     Rehab Codes: M62.81, R26.89, R60.0, M25.66  Onset date: surgery 23                                        Next Dr's appt. : 23    Visit# / total visits:     Cancels/No Shows: 0/0    Subjective:    Pain:  [] Yes  [x] No Location: L knee Pain Rating: (0-10 scale) 5/10  Pain altered Tx:  [x] No  [] Yes  Action:  Comments: Patient arrived noting continued pain with ambulating stairs. Notes increased pain with attempting to ride bike last visit and requested to use NuStep. Objective: Todays Treatment:  Modalities:   Precautions: per most recent ortho note on 3/13/23   At this point we will transition her to full range of motion.  -Continue WBAT left lower extremity  -She has been released for full range of motion of the left knee. She is okay for strength training to the left lower extremity.  She is okay to discontinue her left knee brace once her quadriceps tendon has completely returned compared to contralateral side  Exercises:  Exercise    Reps/ Time Weight/ Level Comments             NuStep   10'               Mat      Clamshells  held Active Add band next    SL hip abduction  held Active     SLR  2x10     Prone hamstring curls  x20 2# wt Ankle weight distally   Prone hip extension x20  Knee straight          Parallel Bars      SB gastroc stretch  3x30\"     HS stretch  3x30\"           Balance Board  3' L2

## 2023-04-05 ENCOUNTER — HOSPITAL ENCOUNTER (OUTPATIENT)
Dept: PHYSICAL THERAPY | Facility: CLINIC | Age: 68
Setting detail: THERAPIES SERIES
Discharge: HOME | End: 2023-04-05
Payer: COMMERCIAL

## 2023-04-05 PROCEDURE — 97110 THERAPEUTIC EXERCISES: CPT

## 2023-04-05 NOTE — FLOWSHEET NOTE
Code: [email or web address]  URL: [employer]/  Date: 03/15/2023  Prepared by: Ana Mckeon    Exercises  Long Sitting Quad Set - 1 x daily - 7 x weekly - 3 sets - 10 reps - 5 sec hold  Small Range Straight Leg Raise - 1 x daily - 7 x weekly - 3 sets - 10 reps  Standing March with Counter Support - 1 x daily - 7 x weekly - 3 sets - 10 reps    Access Code: [email or web address]  URL: [employer]/  Date: 03/03/2023  Prepared by: Mary Amaral  Exercises  Sidelying Hip Abduction - 1 x daily - 7 x weekly - 2 sets - 10 reps  Clamshell - 1 x daily - 7 x weekly - 2 sets - 10 reps      Comprehension of Education:  [x] Verbalizes understanding. [x] Demonstrates understanding. [] Needs review. [] Demonstrates/verbalizes HEP/Ed previously given. Plan: [x] Continue current frequency toward long and short term goals.          Time In: 1:50pm      Time Out: 2:50pm       Electronically signed by:  Mary Amaral PTA

## 2023-04-12 ENCOUNTER — APPOINTMENT (OUTPATIENT)
Dept: PHYSICAL THERAPY | Facility: CLINIC | Age: 68
End: 2023-04-12
Payer: COMMERCIAL

## 2023-04-17 ENCOUNTER — HOSPITAL ENCOUNTER (OUTPATIENT)
Dept: PHYSICAL THERAPY | Facility: CLINIC | Age: 68
Setting detail: THERAPIES SERIES
Discharge: HOME | End: 2023-04-17
Payer: COMMERCIAL

## 2023-04-17 PROCEDURE — 97110 THERAPEUTIC EXERCISES: CPT

## 2023-04-17 NOTE — FLOWSHEET NOTE
[x] [facility]  Outpatient Rehabilitation &  Therapy  [facility]   [place]: [phone]  F: [phone]      Physical Therapy Daily Treatment Note    Date:  2023  Patient Name:  Elana Canchola    :  1955  MRN: [id number]  Physician: Dr. Pisano Alt: Queen Stacie 12 additional visits approved from 23 to 23, 8 additional visits added from 4/3/23-23  Medical Diagnosis: Closed displaced fracture of left patella with routine healing, unspecified fracture morphology, subsequent encounter (S82.002D [ICD-10-CM])                                     Rehab Codes: M62.81, R26.89, R60.0, M25.66  Onset date: surgery 23                                        Next Dr's appt. : 23  Visit# / total visits:  ( for current insurance approval)     Cancels/No Shows: 0/0    Subjective:    Pain:  [] Yes  [x] No Location: L knee Pain Rating: (0-10 scale) not rated/10  Pain altered Tx:  [x] No  [] Yes  Action:  Comments: Patient arrived excited that she was able to go up the stairs at home without an knee pain. Continues to have some mild pain descending stairs. Objective:   Todays Treatment:  Modalities:   Precautions: WBAT  Exercises:  Exercise    Reps/ Time Weight/ Level Comments             NuStep        Bike   10'   Black bike only          Parallel Bars      SB gastroc stretch  3x30\"     HS stretch  2x30\"     Balance Board  3' L2     Step ups  2x10 ea  6\" Added set    Step downs  X10 ea 6\"    Static Lunge with Bolster  1x10     Squats  2x10     Standing Heel/Toe Raises  x20           Gym      4-way hip x20 Lime    Resisted TKE HEP Blue    SLS Rebounder forward x20 yellow                 SLS cones x1 bilat          Bridges  X20     Monster Walks  1L Harvey  Band at [employer] for next treatment: progress strength program as able       Treatment Charges: Mins Units   []  Modalities

## 2023-04-19 ENCOUNTER — HOSPITAL ENCOUNTER (OUTPATIENT)
Dept: PHYSICAL THERAPY | Facility: CLINIC | Age: 68
Setting detail: THERAPIES SERIES
Discharge: HOME | End: 2023-04-19
Payer: COMMERCIAL

## 2023-04-19 PROCEDURE — 97110 THERAPEUTIC EXERCISES: CPT

## 2023-04-19 NOTE — FLOWSHEET NOTE
Referral:  [x] No  [] Yes:  Barriers to Goal Achievement:  [x] No  [] Yes:  Domestic Concerns:  [x] No  [] Yes:      Pt. Education:  [x] Yes  [] No  [x] Reviewed Prior HEP/Ed- continue with previous HEP given. Method of Education: [x] Verbal  [x] Demo  [x] Written    Access Code: [email or web address]  URL: [employer]/  Date: 04/10/2023  Prepared by: Larry Esparza    Exercises  - Supine Bridge  - 1 x daily - 7 x weekly - 2 sets - 10 reps  - Squat with Chair Touch  - 1 x daily - 7 x weekly - 2 sets - 10 reps    Access Code: Nick Stevens  URL: [employer]/  Date: 03/15/2023  Prepared by: Larry Esparza    Exercises  Long Sitting Quad Set - 1 x daily - 7 x weekly - 3 sets - 10 reps - 5 sec hold  Small Range Straight Leg Raise - 1 x daily - 7 x weekly - 3 sets - 10 reps  Standing March with Counter Support - 1 x daily - 7 x weekly - 3 sets - 10 reps    Access Code: [email or web address]  URL: [employer]/  Date: 03/03/2023  Prepared by: Kyara Grimes  Exercises  Sidelying Hip Abduction - 1 x daily - 7 x weekly - 2 sets - 10 reps  Clamshell - 1 x daily - 7 x weekly - 2 sets - 10 reps      Comprehension of Education:  [x] Verbalizes understanding. [x] Demonstrates understanding. [] Needs review. [x] Demonstrates/verbalizes HEP/Ed previously given. Plan: [x] Continue current frequency toward long and short term goals.          Time In: 11:00am      Time Out: 11:53am       Electronically signed by:  Kyara Grimes PTA

## 2023-04-24 ENCOUNTER — HOSPITAL ENCOUNTER (OUTPATIENT)
Dept: PHYSICAL THERAPY | Facility: CLINIC | Age: 68
Setting detail: THERAPIES SERIES
Discharge: HOME | End: 2023-04-24
Payer: COMMERCIAL

## 2023-04-24 PROCEDURE — 97110 THERAPEUTIC EXERCISES: CPT

## 2023-04-24 NOTE — FLOWSHEET NOTE
less than 50% impairment  []  []  []   complete next session   2. Reduce L knee pain levels to 0/10 or less with ADLs []  []  []      3. ? ROM: Increase R knee AROM to at least 0-120 deg (per protocol) to reduce difficulty with ADLs []  [x]  []  2-110 degrees AAROM    4. Patient to demonstrate gait without hinged knee brace without deficits to ease ADL's  []  [x]  []   Continued gait impairments on entrance, wearing hinged knee brace intermittently    4. LE strength 5/5 to ease return to sport/ADL's []  []  []                      Patient goals: walk without brace           Rehab Potential:  [x] Good  [] Fair  [] Poor   Suggested Professional Referral:  [x] No  [] Yes:  Barriers to Goal Achievement:  [x] No  [] Yes:  Domestic Concerns:  [x] No  [] Yes:      Pt. Education:  [x] Yes  [] No  [x] Reviewed Prior HEP/Ed- continue with previous HEP given. Method of Education: [x] Verbal  [x] Demo  [x] Written    Access Code: [email or web address]  URL: [email or web address]/  Date: 04/10/2023  Prepared by: Samreen Tavares    Exercises  - Supine Bridge  - 1 x daily - 7 x weekly - 2 sets - 10 reps  - Squat with Chair Touch  - 1 x daily - 7 x weekly - 2 sets - 10 reps    Access Code: [employer]  URL: [employer]/  Date: 03/15/2023  Prepared by: Samreen Tavares    Exercises  Long Sitting Quad Set - 1 x daily - 7 x weekly - 3 sets - 10 reps - 5 sec hold  Small Range Straight Leg Raise - 1 x daily - 7 x weekly - 3 sets - 10 reps  Standing March with Counter Support - 1 x daily - 7 x weekly - 3 sets - 10 reps    Access Code: [email or web address]  URL: [employer]/  Date: 03/03/2023  Prepared by: Yumiko Maza  Exercises  Sidelying Hip Abduction - 1 x daily - 7 x weekly - 2 sets - 10 reps  Clamshell - 1 x daily - 7 x weekly - 2 sets - 10 reps      Comprehension of Education:  [x] Verbalizes understanding. [x] Demonstrates understanding. [] Needs review.   [x] Demonstrates/verbalizes HEP/Ed previously

## 2023-04-26 ENCOUNTER — APPOINTMENT (OUTPATIENT)
Dept: PHYSICAL THERAPY | Facility: CLINIC | Age: 68
End: 2023-04-26
Payer: COMMERCIAL

## 2023-05-01 ENCOUNTER — HOSPITAL ENCOUNTER (OUTPATIENT)
Dept: PHYSICAL THERAPY | Facility: CLINIC | Age: 68
Setting detail: THERAPIES SERIES
Discharge: HOME | End: 2023-05-01
Payer: COMMERCIAL

## 2023-05-01 PROCEDURE — 97110 THERAPEUTIC EXERCISES: CPT

## 2023-05-01 NOTE — FLOWSHEET NOTE
reps      Comprehension of Education:  [x] Verbalizes understanding. [x] Demonstrates understanding. [] Needs review. [x] Demonstrates/verbalizes HEP/Ed previously given. Plan: [x] Continue current frequency toward long and short term goals.          Time In: 1:00 pm      Time Out: 1:55pm       Electronically signed by:  Janae Mcgill PT

## 2023-05-08 ENCOUNTER — HOSPITAL ENCOUNTER (OUTPATIENT)
Dept: PHYSICAL THERAPY | Facility: CLINIC | Age: 68
Setting detail: THERAPIES SERIES
Discharge: HOME | End: 2023-05-08
Payer: COMMERCIAL

## 2023-05-08 NOTE — CARE COORDINATION
[] [facility]) [facility] &  Therapy  [address]    [phone]  F: [phone]   [] [address]  [facility] [address]   [address]  P: [phone]  F: [phone]  [] [address] &  Therapy  [address]  P: [phone]  F: [phone] [] [address]  P: [phone]  F: [phone]  [x] [address]  [address] [facility] [address]   [address]   [place]: [phone]  F: [phone]   [] [address]  [place]: [phone]   F: [phone]     Physical Therapy Cancel/No Show note    Date: 2023  Patient: Dalia Cintron  : 1955  MRN: [id number]    Cancels/No Shows to date:     For today's appointment patient:    [x]  Cancelled    [] Rescheduled appointment    [] No-show     Reason given by patient:    [x]  Patient ill    []  Conflicting appointment    [] No transportation      [] Conflict with work    [] No reason given    [] Weather related    [] [id number]    [] Other:      Comments:        [x] Next appointment was confirmed    Electronically signed by: Dennise Goldberg

## 2023-05-15 ENCOUNTER — HOSPITAL ENCOUNTER (OUTPATIENT)
Dept: PHYSICAL THERAPY | Facility: CLINIC | Age: 68
Setting detail: THERAPIES SERIES
Discharge: HOME | End: 2023-05-15
Payer: COMMERCIAL

## 2023-05-15 PROCEDURE — 97110 THERAPEUTIC EXERCISES: CPT

## 2023-05-15 NOTE — DISCHARGE SUMMARY
[x] [facility]  Outpatient Rehabilitation &  Therapy  [facility]   [place]: [phone]  F: [phone]      Physical Therapy Discharge Note    Date: 5/15/2023      Patient: Kimberly Kapoor  : 1955  MRN: [id number] Physician: Dr. Dagmar Trujillo: Priti Alcaraz 12 additional visits approved from 23 to 23, 8 additional visits added from 4/3/23-23  Medical Diagnosis: Closed displaced fracture of left patella with routine healing, unspecified fracture morphology, subsequent encounter (S82.002D [ICD-10-CM])                                     Rehab Codes: M62.81, R26.89, R60.0, M25.66  Onset date: surgery 23                                          Next Dr's appt. : 23  Visit# / total visits:  ( for current insurance approval)                      Cancels/No Shows: 0/0  Date range of services: 23 to 23      Subjective:  Pain:  [x] Yes  [x] No   Location: L knee         Pain Rating: (0-10 scale) 0/10  Pain altered Tx:  [x] No  [] Yes  Action:  Comments: Patient arrived without pain. Reports that she had a bout of bruising to the back of her leg last week, had to cancel her PT appointment. Reports that the soreness has since resolved. She has been going to the gym 2x/week. Reports that she has been sore the night of and the day after her workout sessions. Objective:  Test Measurements:  L knee AROM: 0-134 degrees without pain   Able to complete SLR without pain   Able to squat with good mechanics with use of her UE      Function: Normal gait mechanics   Able to sleep on her left side now  Does have some pain when ascending steps and kneeling     32% impairment on the KOOS    Assessment: Good recall on HEP, intermittent cueing required to perfect her form. She continues to complain of anterior hip/thigh soreness.  On exam and during session, patient noted to compensate with her TFL/hip flexor for quad focused

## 2023-05-15 NOTE — FLOWSHEET NOTE
[x] [facility]  Outpatient Rehabilitation &  Therapy  [facility]   [place]: [phone]  F: [phone]      Physical Therapy Daily Treatment Note    Date:  5/15/2023  Patient Name:  Ed Morrison    :  1955  MRN: [id number]  Physician: Dr. Quiñones Roof: Melanie Diza 12 additional visits approved from 23 to 23, 8 additional visits added from 4/3/23-23  Medical Diagnosis: Closed displaced fracture of left patella with routine healing, unspecified fracture morphology, subsequent encounter (S82.002D [ICD-10-CM])                                     Rehab Codes: M62.81, R26.89, R60.0, M25.66  Onset date: surgery 23                                          Next Dr's appt. : 23  Visit# / total visits:  ( for current insurance approval)     Cancels/No Shows: 0/0    Subjective:    Pain:  [x] Yes  [x] No Location: L knee Pain Rating: (0-10 scale) 0/10  Pain altered Tx:  [x] No  [] Yes  Action:  Comments: Patient arrived without pain. Reports that she had a bout of bruising to the back of her leg last week, thinks she overdid it at the gym. Reports that she has been sore the night of and the day after her workout sessions. Objective:   Todays Treatment:  Modalities:   Precautions: WBAT  Exercises:  Exercise    Reps/ Time Weight/ Level Comments Completed 05/15/23                Bike   10'    x          Standing       SB gastroc stretch  3x30\"   x   HS stretch  3x30\"   x   Balance Board  3' L2   x   Step ups+march x20 ea  6\" Focus on eccentric lowering  x   Step downs  x10 ea 6\"     Static Lunge with Bolster  2x10  Parallel bars  x   Squats  2x10  Parallel bar in front  x   Mini wall squats   10x10\"  Added 23     Gym       4-way hip x20 Lime  x   Resisted TKE 10x10\" Purple      SLS Rebounder forward x20 yellow     SLS cones x2 bilat Dropping to 4\" step x   Lateral tap downs  2x10 4\"  x           [employer]

## 2023-05-15 NOTE — PROGRESS NOTES
[] [facility]) [facility] &  Therapy  [address]  [phone]  F: [phone] [] [address]  [facility] [address]   [address]  P: [phone]  F: [phone] [] [address] &  Therapy  [address]  P: [phone]  F: [phone] [] [address]  P: [phone]  F: [phone] [] [address]  [facility]   [address]   [place]: [phone]  F: [phone]      Physical Therapy Progress Note    Date: 5/15/2023      Patient: Myrtle Ganser  : 1955  MRN: [id number] Physician: Dr. Bishop Keep: Marsha Moeller 12 additional visits approved from 23 to 23, 8 additional visits added from 4/3/23-23  Medical Diagnosis: Closed displaced fracture of left patella with routine healing, unspecified fracture morphology, subsequent encounter (S82.002D [ICD-10-CM])                                     Rehab Codes: M62.81, R26.89, R60.0, M25.66  Onset date: surgery 23                                          Next Dr's appt. : 23  Visit# / total visits:  ( for current insurance approval)                      Cancels/No Shows: 0/0  Date range of services: 23 to 23      Subjective:  Pain:  [x] Yes  [x] No   Location: L knee         Pain Rating: (0-10 scale) 0/10  Pain altered Tx:  [x] No  [] Yes  Action:  Comments: Patient arrived without pain. Reports that she had a bout of bruising to the back of her leg last week, had to cancel her PT appointment. Reports that the soreness has since resolved. She has been going to the gym 2x/week. Reports that she has been sore the night of and the day after her workout sessions.      Objective:  Test Measurements:  L knee AROM: 0-134 degrees

## 2023-06-07 ENCOUNTER — OFFICE VISIT (OUTPATIENT)
Dept: ORTHOPEDIC SURGERY | Age: 68
End: 2023-06-07

## 2023-06-07 VITALS — WEIGHT: 121 LBS | HEIGHT: 64 IN | BODY MASS INDEX: 20.66 KG/M2

## 2023-06-07 DIAGNOSIS — S82.002D CLOSED DISPLACED FRACTURE OF LEFT PATELLA WITH ROUTINE HEALING, UNSPECIFIED FRACTURE MORPHOLOGY, SUBSEQUENT ENCOUNTER: Primary | ICD-10-CM

## 2023-06-07 NOTE — PROGRESS NOTES
[facility]  [id number] [address] [facility]  Dept Phone: [phone]  Dept Fax: [phone]      Orthopaedic Trauma Clinic Follow Up      Subjective:   Date of Surgery: 1/5/2023    Nallely Dominguez is a 77y.o. year old female who presents to the clinic today for follow up status post open reduction internal fixation left patella. Patient states she is doing very well. Patient continues to do some therapy, to work on strength of her left lower extremity. Patient's only complaint, is some swelling in the back of her left knee, as well as some weakness and pain going up and down stairs. Otherwise patient has returned to most normal activities. Patient states she has not attempted to play pickle ball yet. Denies any numbness or tingling. Review of Systems  Gen: no fever, chills, malaise  CV: no chest pain or palpitations  Resp: no cough or shortness of breath  GI: no nausea, vomiting, diarrhea, or constipation  Neuro: no seizures, vertigo, or headache  Msk: Per HPI  10 remaining systems reviewed and negative    Objective : There were no vitals filed for this visit. Body mass index is 20.77 kg/m². General: No acute distress, resting comfortably in the clinic  Neuro: alert. oriented  Eyes: Extra-ocular muscles intact  Pulm: Respirations unlabored and regular. Skin: warm, well perfused  Psych:   Patient has good fund of knowledge and displays understanding of exam, diagnosis, and plan. MSK: Left lower extremity: Mature scar noted to anterior left knee. No erythema or signs of infection. Active range of motion left knee 0-130 degrees without any pain throughout. 5/5 muscle strength with resisted knee extension. Compartment soft/compressible. Some mild swelling to the popliteal fossa. Patient expresses normal sensation light touch L3-S1 distribution.        Assessment:   77y.o. year old female status post open reduction internal fixation left patella  Plan:

## 2023-08-07 ENCOUNTER — HOSPITAL ENCOUNTER (OUTPATIENT)
Dept: PHYSICAL THERAPY | Facility: CLINIC | Age: 68
Setting detail: THERAPIES SERIES
Discharge: HOME | End: 2023-08-07
Payer: COMMERCIAL

## 2023-08-07 PROCEDURE — 97110 THERAPEUTIC EXERCISES: CPT

## 2023-08-07 PROCEDURE — 97161 PT EVAL LOW COMPLEX 20 MIN: CPT

## 2023-08-07 NOTE — CONSULTS
[] [facility]) - [facility] &  Therapy  [address] [facility].  [phone]  F: [phone] [] [address] [facility]  [address]   [address]  P: [phone]  F: [phone] [] [address] &  Therapy  [address] [facility]  P: [phone]  F: [phone] [] [facility]  P: [phone]  F: [phone] [x] [address] [facility]  [address] [facility]   [address]   [place]: [phone]  F: [phone]      Physical Therapy Lower Extremity Evaluation    Date:  2023  Patient: Cathi Weaver   : 1955  MRN: [id number]  Physician: Dr. Kathe Malhotra: Sami Mohan ([address] after 12 visits)  Medical Diagnosis: Closed displaced fracture of left patella with routine healing, unspecified fracture morphology, subsequent encounter (S82.002D [ICD-10-CM])                                     Rehab Codes: M62.81, R26.89, R60.0, M25.66  Onset date: 23                                     Next 's appt. : --      Subjective:   CC/HPI: Patient is a 78 y/o female presenting with left knee pain. Has hx of left patellar fracture. Her last ortho appointment was on 23. She went on a vacation to [place] for 5 weeks. Feels that she continues to have some weakness, pain, and intermittent swelling of her knee. She reports pain with kneeling, though she has recently been able to do this over the last few weeks. Has been trying to go to [employer], riding the cardio equipment mostly. Was playing pickleball prior to the knee surgery, she would like to return to play when she is confident in her knee again.        PMHx: [] Unremarkable [] Diabetes [] HTN  [] Pacemaker   [] MI/Heart Problems [] Cancer [] Arthritis [] Other:              [x] Refer to

## 2023-08-09 ENCOUNTER — HOSPITAL ENCOUNTER (OUTPATIENT)
Dept: PHYSICAL THERAPY | Facility: CLINIC | Age: 68
Setting detail: THERAPIES SERIES
Discharge: HOME | End: 2023-08-09
Payer: COMMERCIAL

## 2023-08-09 PROCEDURE — 97110 THERAPEUTIC EXERCISES: CPT

## 2023-08-09 NOTE — FLOWSHEET NOTE
[] [address]  [address] [facility].  [phone]  F: [phone] [] [address] [facility]  [address]   [address]  P: [phone]  F: [phone] [] [address]  [address] [facility]  P: [phone]  F: [phone] [] [place]: [phone]  F: [phone] [x] [address] [facility]  [address] [facility]   [address]   P: [phone]  F: [phone]  [] [address] [facility].   P: [phone]  F: [phone] [] [address] [facility]  [address]  P: [phone]  F: [phone] [] [address] [facility]  [address] [facility]  [place]: [phone]  F: [phone] [] [address] [facility] [address]  [place]: [phone]  F: [phone]      Physical Therapy Daily Treatment Note    Date:  2023  Patient Name:  Ollie Jain    :  1955  MRN: [id number]  Physician: Dr. Leigha Spring: Jose Cronin ([address] after 12 visits)  Medical Diagnosis: Closed displaced fracture of left patella with routine healing, unspecified fracture morphology, subsequent encounter (S82.002D [ICD-10-CM])                                     Rehab Codes: M62.81, R26.89, R60.0, M25.66  Onset date: 23                                     Next 's appt. : --  Visit# / total visits: 2   Cancels/No Shows: 0    Subjective:    Pain:  [] Yes  [x] No Location: N/A Pain Rating: (0-10 scale) 0/10  Pain altered Tx:  [x] No  [] Yes  Action:  Comments: Patient denies knee

## 2023-08-14 ENCOUNTER — HOSPITAL ENCOUNTER (OUTPATIENT)
Dept: PHYSICAL THERAPY | Facility: CLINIC | Age: 68
Setting detail: THERAPIES SERIES
Discharge: HOME | End: 2023-08-14
Payer: COMMERCIAL

## 2023-08-14 PROCEDURE — 97110 THERAPEUTIC EXERCISES: CPT

## 2023-08-14 NOTE — FLOWSHEET NOTE
[]  []      2. ? ROM: Increase hip extension and ankle DF to at least 10 degrees AROM limitations throughout to reduce difficulty with ADLs []  []  []      3. ? Strength: Increase MMT to 5/5 throughout to ease functional limitations and mobility  []  []  []      4. Independent with Home Exercise Programs []  []  []        []  []  []        []  []  []      Date Addressed:            LTG: To be met in 12 treatments           1. Improve score on assessment tool KOOS from 18% impairment to less than 7% impairment  []  []  []      2. Reduce pain levels to 0/10 or less with ADLs []  []  []      3. Patient to demonstrate a standing squat with good mechanics and no increase in pain  []  []  []                                  Patient goals: reduce pain         Pt. Education:  [x] Yes  [] No  [x] Reviewed Prior HEP/Ed  Method of Education: [x] Verbal  [x] Demo  [] Written  Comprehension of Education:  [x] Verbalizes understanding. [x] Demonstrates understanding. [x] Needs review. [] Demonstrates/verbalizes HEP/Ed previously given. Plan: [x] Continue current frequency toward long and short term goals.     [x] Specific Instructions for subsequent treatments: see above       Time In: 9:00a             Time Out: 10:50a    Electronically signed by:  Joyce Mallory PTA

## 2023-08-17 ENCOUNTER — HOSPITAL ENCOUNTER (OUTPATIENT)
Dept: PHYSICAL THERAPY | Facility: CLINIC | Age: 68
Setting detail: THERAPIES SERIES
Discharge: HOME | End: 2023-08-17
Payer: COMMERCIAL

## 2023-08-17 PROCEDURE — 97110 THERAPEUTIC EXERCISES: CPT

## 2023-08-17 NOTE — FLOWSHEET NOTE
[] [address]  [address] [facility].  [phone]  F: [phone] [] [address] [facility]  [address]   [address]  P: [phone]  F: [phone] [] [address]  [address] [facility]  P: [phone]  F: [phone] [] [place]: [phone]  F: [phone] [x] [address] [facility]  [address] [facility]   [address]   P: [phone]  F: [phone]  [] [address] [facility].   P: [phone]  F: [phone] [] [address] [facility]  [address] [facility] [address]  P: [phone]  F: [phone] [] [address] [facility]  [address] [facility]  [place]: [phone]  F: [phone] [] [address]  [place]: [phone]  F: [phone]      Physical Therapy Daily Treatment Note    Date:  2023  Patient Name:  Demetrio Tavares    :  1955  MRN: [id number]  Physician: Dr. Drake Solares: Nani Templeton ([address] after 12 visits)  Medical Diagnosis: Closed displaced fracture of left patella with routine healing, unspecified fracture morphology, subsequent encounter (S82.002D [ICD-10-CM])                                     Rehab Codes: M62.81, R26.89, R60.0, M25.66  Onset date: 23                                     Next 's appt. : --    Visit# / total visits:    Cancels/No Shows: 0    Subjective:    Pain:  [] Yes  [x] No Location: N/A Pain Rating: (0-10 scale) 0/10  Pain altered Tx:  [x] No  [] Yes  Action:  Comments: Patient notes no

## 2023-08-21 ENCOUNTER — HOSPITAL ENCOUNTER (OUTPATIENT)
Dept: PHYSICAL THERAPY | Facility: CLINIC | Age: 68
Setting detail: THERAPIES SERIES
Discharge: HOME | End: 2023-08-21
Payer: COMMERCIAL

## 2023-08-21 PROCEDURE — 97110 THERAPEUTIC EXERCISES: CPT

## 2023-08-21 NOTE — FLOWSHEET NOTE
[] [address]  [address] [facility] [place] [facility].  [phone]  F: [phone] [] [address] [facility]  [address]   [address]  P: [phone]  F: [phone] [] [address]  [address] [facility]  P: [phone]  F: [phone] [] [place]: [phone]  F: [phone] [x] [address]  [facility]   [address]   P: [phone]  F: [phone]  [] [address]   P: [phone]  F: [phone] [] 205 [facility]  [address]  P: [phone]  F: [phone] [] 224 [facility]  [address] [facility]  [place]: [phone]  F: [phone] [] [address] [facility] [place] [address]  [place]: [phone]  F: [phone]      Physical Therapy Daily Treatment Note    Date:  2023  Patient Name:  Radha Monique    :  1955  MRN: [id number]  Physician: Dr. Fung Askew: Gris Rodríguez ([address] after 12 visits)  Medical Diagnosis: Closed displaced fracture of left patella with routine healing, unspecified fracture morphology, subsequent encounter (S82.002D [ICD-10-CM])                                     Rehab Codes: M62.81, R26.89, R60.0, M25.66  Onset date: 23                                     Next 's appt. : --    Visit# / total visits:    Cancels/No Shows: 0    Subjective:    Pain:  [] Yes  [x] No Location: N/A Pain Rating: (0-10 scale) 0/10  Pain altered Tx:  [x] No  [] Yes  Action:  Comments: Patient arrived

## 2023-08-23 ENCOUNTER — HOSPITAL ENCOUNTER (OUTPATIENT)
Dept: PHYSICAL THERAPY | Facility: CLINIC | Age: 68
Setting detail: THERAPIES SERIES
Discharge: HOME | End: 2023-08-23
Payer: COMMERCIAL

## 2023-08-23 PROCEDURE — 97110 THERAPEUTIC EXERCISES: CPT

## 2023-08-23 NOTE — FLOWSHEET NOTE
treatments            1. ? Pain: Decrease R knee pain levels to 1/10 with ADLs []  []  []      2. ? ROM: Increase hip extension and ankle DF to at least 10 degrees AROM limitations throughout to reduce difficulty with ADLs []  []  []      3. ? Strength: Increase MMT to 5/5 throughout to ease functional limitations and mobility  []  []  []      4. Independent with Home Exercise Programs []  []  []        []  []  []        []  []  []      Date Addressed:            LTG: To be met in 12 treatments           1. Improve score on assessment tool KOOS from 18% impairment to less than 7% impairment  []  []  []      2. Reduce pain levels to 0/10 or less with ADLs []  []  []      3. Patient to demonstrate a standing squat with good mechanics and no increase in pain  []  []  []                                  Patient goals: reduce pain         Pt. Education:  [x] Yes  [] No  [x] Reviewed Prior HEP/Ed: Reviewed proper squat form   Method of Education: [x] Verbal  [x] Demo  [] Written  Comprehension of Education:  [x] Verbalizes understanding. [x] Demonstrates understanding. [x] Needs review. [] Demonstrates/verbalizes HEP/Ed previously given. Plan: [x] Continue current frequency toward long and short term goals.     [x] Specific Instructions for subsequent treatments: see above       Time In: 9:55a             Time Out: 10:57a    Electronically signed by:  Park Franco PTA

## 2023-08-28 ENCOUNTER — HOSPITAL ENCOUNTER (OUTPATIENT)
Dept: PHYSICAL THERAPY | Facility: CLINIC | Age: 68
Setting detail: THERAPIES SERIES
Discharge: HOME | End: 2023-08-28
Payer: COMMERCIAL

## 2023-08-28 PROCEDURE — 97110 THERAPEUTIC EXERCISES: CPT

## 2023-08-31 ENCOUNTER — HOSPITAL ENCOUNTER (OUTPATIENT)
Dept: PHYSICAL THERAPY | Facility: CLINIC | Age: 68
Setting detail: THERAPIES SERIES
Discharge: HOME | End: 2023-08-31
Payer: COMMERCIAL

## 2023-08-31 PROCEDURE — 97110 THERAPEUTIC EXERCISES: CPT

## 2023-08-31 NOTE — FLOWSHEET NOTE
[x] THE [facility] &  Therapy  [address]   [address]   [place]: [phone]  F: [phone]      Physical Therapy Daily Treatment Note    Date:  2023  Patient Name:  Jeanette Gilliam    :  1955  MRN: [id number]  Physician: Dr. Danilo Thomas: Toni Rabago ([address] after 12 visits)  Medical Diagnosis: Closed displaced fracture of left patella with routine healing, unspecified fracture morphology, subsequent encounter (S82.002D [ICD-10-CM])                                     Rehab Codes: M62.81, R26.89, R60.0, M25.66  Onset date: 23                                     Next Dr's appt. : --    Visit# / total visits:    Cancels/No Shows: 0    Subjective:    Pain:  [] Yes  [x] No Location: LLE  Pain Rating: (0-10 scale) 0/10  Pain altered Tx:  [x] No  [] Yes  Action:  Comments: Patient arrived stating continued issues with kneeling.      Objective:  Modalities:   Precautions: Standard   Exercise     L Knee Reps/ Time Weight/ Level Comments             Bike x10'   Black bike             Calf Slantboard Stretch  3x30\"       Hamstring Stretch  3x30\"   At step              Supine quadriceps stretch  2x30\"       Supine hip flexor stretch with contralateral knee to chest  1'                 Clamshells   x20 Lime     Bridges on Pball   x20       Sidelying hip abduction   x20 Lime     SLR                Bench hovers 10x5\"     Squats to mat table  x10   Encourage posterior hip hinge   Step-up to march 2x10 6\"    Lateral step up and over 2x10 6\"    Lunges 2x10      Lateral Lunge 2x10     Cones drops  2x      Monster walks  2L Lime    Balance board 3' L2    Heel taps 2x10 4\"    TRX Squats 2x10     Total Gym SL Squats 2x10 L20       Specific Instructions for next treatment: focus on ADL mechanics, squats, lateral motions for return to pickleball        Treatment Charges: Mins Units   []  Modalities     [x]  Ther Exercise 45 3   []

## 2023-11-27 ENCOUNTER — HOSPITAL ENCOUNTER (OUTPATIENT)
Dept: PHYSICAL THERAPY | Facility: CLINIC | Age: 68
Setting detail: THERAPIES SERIES
Discharge: HOME | End: 2023-11-27

## 2023-11-27 NOTE — CARE COORDINATION
[] [address]  4600 [facility] [place] [facility].  [phone]  F: [phone] [] 204 [facility]  [address]   [address]  P: [phone]  F: [phone] [] [address]  [address] [facility]  P: [phone]  F: [phone] [] [facility]: [phone]  F: [phone] [x] [address] [facility]  [facility]   [address]   P: [phone]  F: [phone]  [] [address] [facility].   P: [phone]  F: [phone] [] 205 [facility]  [address]   [address]  P: [phone]  F: [phone] [] [address] [facility]  [address] [facility]  [place]: [phone]  F: [phone] [] [address]  [place]: [phone]  F: [phone]      Therapy Cancel/No Show note    Date: 2023  Patient: Rajinder Szymanski  : 1955  MRN: [id number]    Cancels/No Shows to date:     For today's appointment patient:    []  Cancelled    [] Rescheduled appointment    [x] No-show     Reason given by patient:    []  Patient ill    []  Conflicting appointment    [] No transportation      [] Conflict with work    [] No reason given    [] Weather related    [] [id number]    [] Other:      Comments:  Called patient and r/s appointment      [x] Next appointment was confirmed    Electronically signed by: Michelle Lozano

## 2023-12-04 ENCOUNTER — HOSPITAL ENCOUNTER (OUTPATIENT)
Dept: PHYSICAL THERAPY | Facility: CLINIC | Age: 68
Setting detail: THERAPIES SERIES
Discharge: HOME | End: 2023-12-04
Payer: COMMERCIAL

## 2023-12-04 PROCEDURE — 97161 PT EVAL LOW COMPLEX 20 MIN: CPT

## 2023-12-04 PROCEDURE — 97110 THERAPEUTIC EXERCISES: CPT

## 2023-12-04 NOTE — CONSULTS
[] [facility]) - [facility] &  Therapy  [address] [facility].  [phone]  F: [phone] [] 204 [facility]  [address]   [address]  P: [phone]  F: [phone] [] [address] &  Therapy  [address]  P: [phone]  F: [phone] [] [facility]  P: [phone]  F: [phone] [x] [address]   [address]   [place]: [phone]  F: [phone]        Physical Therapy Spine Evaluation    Date:  2023  Patient: Waldo Garcia  : 1955  MRN: [id number]  Physician: Dr. Demetri Cardoza: Maury Schwartz after eval)  Medical Diagnosis:  Spondylosis without myelopathy or radiculopathy, cervical region (M47.812)  Rehab Codes: M62.81, R26.89, M62.838 , R29.3   Onset Date: 10/15/23  Next 's appt. : --       Subjective:   CC/HPI: Patient is a 78 y/o female presenting to PT clinic with complaints of neck pain after exercise that has been going on since 10/15/23. She reports the pain started after she was working out her arms on the machines. She initially had some arm numbness, but the numbness has resolved. She has had improved sleep since the initial pain onset. Has been taking tylenol and aleve as needed.      PMHx:   [] Unremarkable               [x] Refer to full medical chart  In EPIC     Past Medical History          Diagnosis Date Comments     Hypothyroid [E03.9]                       Surgical History          Past Surgical History         Laterality Date Comments   Finger trigger release [THQ108] Right 2015    Patella surgery [[id number]] Left 2023 OPEN REDUCTION INTERNAL FIXATION PATELLA FRACTURE ,LEFT KNEE CAPSULE REPAIR,   Ankle fracture surgery [[id number]] Left 2023 OPEN REDUCTION INTERNAL

## 2023-12-08 ENCOUNTER — APPOINTMENT (OUTPATIENT)
Dept: PHYSICAL THERAPY | Facility: CLINIC | Age: 68
End: 2023-12-08
Payer: COMMERCIAL

## (undated) DEVICE — SUTURE ETHLN SZ 2-0 L18IN NONABSORBABLE BLK L26MM PS 3/8 585H

## (undated) DEVICE — BRACE ORTH HINGED POST OPERATIVE DONJOY XACT ROM LT

## (undated) DEVICE — GLOVE ORANGE PI 8   MSG9080

## (undated) DEVICE — CYSTO/BLADDER IRRIGATION SET, REGULATING CLAMP

## (undated) DEVICE — BANDAGE COMPR W6INXL12FT SMOOTH FOR LIMB EXSANG ESMARCH

## (undated) DEVICE — HEWSON SUTURE RETRIEVER: Brand: HEWSON SUTURE RETRIEVER

## (undated) DEVICE — GOWN,AURORA,NONREINFORCED,LARGE: Brand: MEDLINE

## (undated) DEVICE — ELECTRODE PT RET AD L9FT HI MOIST COND ADH HYDRGEL CORDED

## (undated) DEVICE — SVMMC ORTH SPL DRP PK

## (undated) DEVICE — GLOVE ORANGE PI 7 1/2   MSG9075

## (undated) DEVICE — SUTURE MCRYL SZ 2-0 L27IN ABSRB UD SH L26MM TAPERPOINT NDL Y417H

## (undated) DEVICE — SUTURE FIBERWIRE SZ 5 L38IN NONABSORBABLE BLU L48MM 1/2 AR7211

## (undated) DEVICE — APPLICATOR MEDICATED 10.5 CC SOLUTION HI LT ORNG CHLORAPREP

## (undated) DEVICE — INTENDED FOR TISSUE SEPARATION, AND OTHER PROCEDURES THAT REQUIRE A SHARP SURGICAL BLADE TO PUNCTURE OR CUT.: Brand: BARD-PARKER ® CARBON RIB-BACK BLADES

## (undated) DEVICE — DRESSING FOAM W4XL10IN AG SIL ADH ANTIMIC POSTOP OPTIFOAM

## (undated) DEVICE — GLOVE ORANGE PI 7   MSG9070

## (undated) DEVICE — SUTURE PDS II SZ 0 L27IN ABSRB VLT L36MM CT-1 1/2 CIR Z340H

## (undated) DEVICE — DRAPE,U/ SHT,SPLIT,PLAS,STERIL: Brand: MEDLINE

## (undated) DEVICE — GLOVE SURG SZ 65 THK91MIL LTX FREE SYN POLYISOPRENE

## (undated) DEVICE — PADDING CAST W6INXL4YD COT LO LINTING WYTEX

## (undated) DEVICE — SOLUTION IRRIG 3000ML 0.9% SOD CHL USP UROMATIC PLAS CONT

## (undated) DEVICE — GLOVE SURG SZ 6 THK91MIL LTX FREE SYN POLYISOPRENE ANTI

## (undated) DEVICE — C-ARMOR C-ARM EQUIPMENT COVERS CLEAR STERILE UNIVERSAL FIT 12 PER CASE: Brand: C-ARMOR

## (undated) DEVICE — ZIMMER® STERILE DISPOSABLE TOURNIQUET CUFF WITH PROTECTIVE SLEEVE AND PLC, DUAL PORT, SINGLE BLADDER, 24 IN. (61 CM)

## (undated) DEVICE — APPLICATOR MEDICATED 26 CC SOLUTION HI LT ORNG CHLORAPREP

## (undated) DEVICE — BANDAGE,GAUZE,BULKEE II,4.5"X4.1YD,STRL: Brand: MEDLINE

## (undated) DEVICE — SURGICAL SUCTION CONNECTING TUBE WITH MALE CONNECTOR AND SUCTION CLAMP, 2 FT. LONG (.6 M), 5 MM I.D.: Brand: CONMED

## (undated) DEVICE — DRAPE,REIN 53X77,STERILE: Brand: MEDLINE

## (undated) DEVICE — GARMENT,MEDLINE,DVT,INT,CALF,MED, GEN2: Brand: MEDLINE